# Patient Record
Sex: FEMALE | Race: WHITE
[De-identification: names, ages, dates, MRNs, and addresses within clinical notes are randomized per-mention and may not be internally consistent; named-entity substitution may affect disease eponyms.]

---

## 2022-08-08 ENCOUNTER — HOSPITAL ENCOUNTER (EMERGENCY)
Dept: HOSPITAL 97 - ER | Age: 57
Discharge: HOME | End: 2022-08-08
Payer: COMMERCIAL

## 2022-08-08 VITALS — OXYGEN SATURATION: 99 % | DIASTOLIC BLOOD PRESSURE: 99 MMHG | SYSTOLIC BLOOD PRESSURE: 152 MMHG

## 2022-08-08 VITALS — TEMPERATURE: 97.9 F

## 2022-08-08 DIAGNOSIS — L03.116: Primary | ICD-10-CM

## 2022-08-08 DIAGNOSIS — I10: ICD-10-CM

## 2022-08-08 DIAGNOSIS — L03.115: ICD-10-CM

## 2022-08-08 DIAGNOSIS — Z88.1: ICD-10-CM

## 2022-08-08 DIAGNOSIS — Z88.5: ICD-10-CM

## 2022-08-08 LAB
BUN BLD-MCNC: 27 MG/DL (ref 7–18)
CRP SERPL-MCNC: 6.34 MG/L (ref ?–3)
GLUCOSE SERPLBLD-MCNC: 124 MG/DL (ref 74–106)
HCT VFR BLD CALC: 43.4 % (ref 36–45)
LYMPHOCYTES # SPEC AUTO: 1.3 K/UL (ref 0.7–4.9)
MCV RBC: 88.8 FL (ref 80–100)
PMV BLD: 7.9 FL (ref 7.6–11.3)
POTASSIUM SERPL-SCNC: 4.7 MMOL/L (ref 3.5–5.1)
RBC # BLD: 4.89 M/UL (ref 3.86–4.86)

## 2022-08-08 PROCEDURE — 86140 C-REACTIVE PROTEIN: CPT

## 2022-08-08 PROCEDURE — 85025 COMPLETE CBC W/AUTO DIFF WBC: CPT

## 2022-08-08 PROCEDURE — 84145 PROCALCITONIN (PCT): CPT

## 2022-08-08 PROCEDURE — 87070 CULTURE OTHR SPECIMN AEROBIC: CPT

## 2022-08-08 PROCEDURE — 36415 COLL VENOUS BLD VENIPUNCTURE: CPT

## 2022-08-08 PROCEDURE — 99283 EMERGENCY DEPT VISIT LOW MDM: CPT

## 2022-08-08 PROCEDURE — 87205 SMEAR GRAM STAIN: CPT

## 2022-08-08 PROCEDURE — 85652 RBC SED RATE AUTOMATED: CPT

## 2022-08-08 PROCEDURE — 80048 BASIC METABOLIC PNL TOTAL CA: CPT

## 2022-08-08 NOTE — XMS REPORT
Continuity of Care Document

                            Created on:2022



Patient:NICOLE BUNN

Sex:Female

:1965

External Reference #:[id number]





Demographics







                          Address                   [address] [place] [address]

 

                          Home Phone                [phone]

 

                          Mobile Phone              [phone]

 

                          Email Address             [email or web address]

 

                          Preferred Language        Unknown

 

                          Marital Status            Unknown

 

                          Anglican Affiliation     Unknown

 

                          Race                      Unknown

 

                          Ethnic Group              Unknown









Author







                          Organization              [facility]

t

 

                          Address                   [address]



                                                    [place] [address]

 

                          Phone                     [phone]









Support







                Name            Relationship    Address         Phone

 

                1               Unavailable     Unavailable     Unavailable









Care Team Providers







                    Name                Role                Phone

 

                    BIANCA NÚÑEZ Attending Clinician Unavailable

 

                    Wilton SALGADO, Bianca Cotter Attending Clinician [phone]

0

 

                    [facility]               Attending Clinician Unavailable









Payers







           Payer Name Policy Type Policy Number Effective Date Expiration Date S

shlomo

 

           [employer]       2          [id number] 2022 00:00:00            







Problems

This patient has no known problems.



Allergies, Adverse Reactions, Alerts

This patient has no known allergies or adverse reactions.



Medications

This patient has no known medications.



Procedures

This patient has no known procedures.



Encounters







        Start   End     Encounter Admission Attending Care    Care    Encounter 

Source



        Date/Time Date/Time Type    Type    Clinicians Facility Department ID   

   

 

        2022 Outpatient         ASHA NÚÑEZ  [id number]

[address]



        13:30:00 13:30:00                 BIANCA wilson

 

        2022 Office          Akash Núñez    [id number]

[address]



        16:15:00 16:45:00 Visit           Bianca Wallace [id number]         Se

gómez Cotter         [id number]         



                                                        [phone]         



                                                        0               

 

        2022 Outpatient         [facility]   ASHA BARRY  [id number]

[address]



        10:15:00 10:15:00                                                 Jose Luisol

steve

 

        2022 Office          Akash Núñez    [id number]

[address]



        09:30:00 10:00:00 Visit           Bianca Wallace [id number]         Se

gómez Morrisonyi         [id number]         



                                                        [phone]         



                                                        0               







Results

This patient has no known results.

## 2022-08-08 NOTE — RAD REPORT
EXAM DESCRIPTION:  RAD - Foot Right 3 View - 8/8/2022 8:31 am

 

CLINICAL HISTORY:  cellulitis great toe x 3 weeks, eval for osteo

 

COMPARISON:  Foot Right 3 View dated 7/29/2022

 

FINDINGS:  No fracture of the first toe phalanges. No cortical disruption or other findings of bone d
estructive osteomyelitis. Degenerative findings on the July 29 study have not changed.

 

Soft tissues of the lower extremity are thickened and edematous matching prior examination. No new ai
r, calcification or foreign body in the soft tissues.

 

IMPRESSION:  No new finding since the July 29 examination.

 

Continued surveillance can be performed if there is ongoing soft tissue infection. Osteomyelitis can 
be present prior to radiographic bone destruction.

## 2022-08-08 NOTE — EDPHYS
Physician Documentation                                                                           

 Former[facility]                                                                 

Name: Herminia Amaro                                                                             

Age: 57 yrs                                                                                       

Sex: Female                                                                                       

: 1965                                                                                   

MRN: [id number]                                                                                   

Arrival Date: 2022                                                                          

Time: 07:14                                                                                       

Account#: [id number]                                                                            

Bed 5                                                                                             

Private MD:                                                                                       

ED Physician Dennis Walls                                                                         

HPI:                                                                                              

                                                                                             

07:49 This 57 yrs old Female presents to ER via Wheelchair with complaints of cellulitis.     rn  

07:49 The patient presents with cellulitis of the right foot and left foot. Description:      rn  

      erythematous, warm. Onset: The symptoms/episode began/occurred 2 week(s) ago. Possible      

      cause(s): unknown. Associated signs and symptoms: Pertinent positives: erythema,            

      swelling, Pertinent negatives: fever. Modifying factors: the symptoms are alleviated by     

      prescription meds, clindamycin. Severity of symptoms: At their worst the symptoms were      

      mild, in the emergency department the symptoms have improved. The patient has not           

      experienced similar symptoms in the past. The patient has been recently seen by a           

      physician:. Pt reports infection of feet for 2 weeks. Seen by physician when it began,      

      put on bactrim, didn't seem to be doing much, recently put on clindamycin for 2 days        

      with some improvement of redness. Reports small blisters to big toes recently popped,       

      no fever, no streaking, no pain. Has been soaking in epsom salt and listerine. .            

                                                                                                  

Historical:                                                                                       

- Allergies:                                                                                      

07:41 Codeine;                                                                                jl7 

07:41 Augmentin;                                                                              jl7 

- Home Meds:                                                                                      

07:41 olmesartan 40 mg oral tab 1 tab once daily [Active];                                    jl7 

- PMHx:                                                                                           

07:41 Hypertensive disorder; guillain-barre syndrome;                                         jl7 

                                                                                                  

- Immunization history:: Client reports having NOT received the Covid vaccine.                    

- Social history:: Smoking status: Patient denies any tobacco usage or history of.                

- Family history:: not pertinent.                                                                 

- Hospitalizations: : No recent hospitalization is reported.                                      

                                                                                                  

                                                                                                  

ROS:                                                                                              

07:49 Constitutional: Negative for fever, chills, and weight loss, Eyes: Negative for injury, rn  

      pain, redness, and discharge, Neck: Negative for injury, pain, and swelling,                

      Cardiovascular: Negative for chest pain, palpitations, and edema, Respiratory: Negative     

      for shortness of breath, cough, wheezing, and pleuritic chest pain, Abdomen/GI:             

      Negative for abdominal pain, nausea, vomiting, diarrhea, and constipation, Back:            

      Negative for injury and pain, MS/Extremity: Negative for injury and deformity, Skin: +      

      redness and swelling to bitleral 1st toes. Neuro: Negative for headache, weakness,          

      numbness, tingling, and seizure.                                                            

                                                                                                  

Exam:                                                                                             

07:49 Constitutional:  This is a well developed, well nourished patient who is awake, alert,  rn  

      and in no acute distress. Head/Face:  Normocephalic, atraumatic. Cardiovascular:            

      Regular rate and rhythm.  No pulse deficits. Respiratory:  No increased work of             

      breathing, no retractions or nasal flaring. Skin:  Warm, dry. + mild erythema to            

      bilateral 1st toes, each with unroofed blisters, right toe blister is dry and               

      non-fluctuant, left toe blister is still draining a small amount of pus and also            

      non-fluctuanct. No streaking. MS/ Extremity:  Pulses equal, no cyanosis.  Neuro:  Awake     

      and alert, GCS 15                                                                           

                                                                                                  

Vital Signs:                                                                                      

07:38 Weight 136.08 kg; Height 5 ft. 6 in. (167.64 cm); Pain 3/10;                            jl7 

07:51 Temp 97.9(O);                                                                           kr3 

09:35  / 99; Pulse 79; Resp 16; Pulse Ox 99% on R/A;                                    kr3 

07:38 Body Mass Index 48.42 (136.08 kg, 167.64 cm)                                            jl7 

                                                                                                  

MDM:                                                                                              

07:14 Patient medically screened.                                                             rn  

09:11 Differential diagnosis: cellulitis. Data reviewed: vital signs, nurses notes, lab test  rn  

      result(s), radiologic studies, plain films, and as a result, I will discharge patient.      

      Counseling: I had a detailed discussion with the patient and/or guardian regarding: the     

      historical points, exam findings, and any diagnostic results supporting the                 

      discharge/admit diagnosis, lab results, radiology results, the need for outpatient          

      follow up, to return to the emergency department if symptoms worsen or persist or if        

      there are any questions or concerns that arise at home. Special discussion: I discussed     

      with the patient/guardian in detail that at this point there is no indication for           

      admission to the hospital. It is understood, however, that if the symptoms persist or       

      worsen the patient needs to return immediately for re-evaluation. ED course: WBC            

      normal, sed rate normal, no osteo on xrays, stable vitals and pt reports improving on       

      clindamycin. Will dc home with continuation of clindamycin, told to not soak so much,       

      continue mupirocin on open blisters, and return if worsens for admission at that point..    

                                                                                                  

                                                                                             

07:49 Order name: CBC with Diff; Complete Time: 08:51                                         rn  

                                                                                             

07:49 Order name: Basic Metabolic Panel; Complete Time: 09:10                                 rn  

                                                                                             

07:49 Order name: Procalcitonin                                                               rn  

                                                                                             

07:49 Order name: Sed Rate; Complete Time: 08:51                                              rn  

                                                                                             

07:49 Order name: CRP; Complete Time: 09:10                                                   rn  

                                                                                             

07:49 Order name: Wound Culture                                                               rn  

                                                                                             

07:49 Order name: XRAY Foot LEFT 3 View; Complete Time: 08:51                                 rn  

                                                                                             

07:49 Order name: XRAY Foot RIGHT 3 View; Complete Time: 08:51                                rn  

                                                                                             

07:49 Order name: IV Start; Complete Time: 08:12                                              rn  

                                                                                                  

Administered Medications:                                                                         

No medications were administered                                                                  

                                                                                                  

                                                                                                  

Disposition Summary:                                                                              

22 09:12                                                                                    

Discharge Ordered                                                                                 

      Location: Home                                                                          rn  

      Problem: new                                                                            rn  

      Symptoms: have improved                                                                 rn  

      Condition: Stable                                                                       rn  

      Diagnosis                                                                                   

        - Cellulitis of left lower limb                                                       rn  

        - Cellulitis of right lower limb                                                      rn  

      Followup:                                                                               rn  

        - With: Private Physician                                                                  

        - When: As needed                                                                          

        - Reason: Recheck today's complaints, Re-evaluation by your physician                      

      Discharge Instructions:                                                                     

        - Discharge Summary Sheet                                                             rn  

        - Cellulitis, Adult                                                                   rn  

      Forms:                                                                                      

        - Medication Reconciliation Form                                                      rn  

        - Thank You Letter                                                                    rn  

        - Antibiotic Education                                                                rn  

        - Prescription Opioid Use                                                             rn  

Signatures:                                                                                       

Dispatcher [employer]                           Dennis Berg MD MD rn Leal, Jahala, RN                        RN   jl7                                                  

                                                                                                  

**************************************************************************************************

## 2022-08-08 NOTE — ER
Nurse's Notes                                                                                     

 [facility]                                                                 

Name: Herminia Amaro                                                                             

Age: 57 yrs                                                                                       

Sex: Female                                                                                       

: 1965                                                                                   

MRN: [id number]                                                                                   

Arrival Date: 2022                                                                          

Time: 07:14                                                                                       

Account#: [id number]                                                                            

Bed 5                                                                                             

Private MD:                                                                                       

Diagnosis: Cellulitis of left lower limb;Cellulitis of right lower limb                           

                                                                                                  

Presentation:                                                                                     

                                                                                             

07:38 Chief complaint: Patient states: Bilateral feet swelling and blisters for a few weeks,  jl7 

      PCP started antibiotics on 22, clindamycin started 22, redness improved,           

      blisters worse. Coronavirus screen: At this time, the client does not indicate any          

      symptoms associated with coronavirus-19. Ebola Screen: No symptoms or risks identified      

      at this time. Risk Assessment: Do you want to hurt yourself or someone else? Patient        

      reports no desire to harm self or others. Onset of symptoms was 2022.                  

07:38 Method Of Arrival: Wheelchair                                                           [facility] 

07:38 Acuity: MARCIO 3                                                                           [facility] 

09:40 Initial Sepsis Screen: Does the patient meet any 2 criteria? No. Patient's initial      kr3 

      sepsis screen is negative. Does the patient have a suspected source of infection? Yes:      

      Skin breakdown/wound.                                                                       

                                                                                                  

Triage Assessment:                                                                                

07:41 General: Appears in no apparent distress. uncomfortable, Behavior is calm, cooperative, jl7 

      appropriate for age. Pain: Complains of pain in right foot and left foot Pain currently     

      is 3 out of 10 on a pain scale.                                                             

                                                                                                  

Historical:                                                                                       

- Allergies:                                                                                      

07:41 Codeine;                                                                                jl7 

07:41 Augmentin;                                                                              jl7 

- Home Meds:                                                                                      

07:41 olmesartan 40 mg oral tab 1 tab once daily [Active];                                    jl7 

- PMHx:                                                                                           

07:41 Hypertensive disorder; guillain-barre syndrome;                                         jl7 

                                                                                                  

- Immunization history:: Client reports having NOT received the Covid vaccine.                    

- Social history:: Smoking status: Patient denies any tobacco usage or history of.                

- Family history:: not pertinent.                                                                 

- Hospitalizations: : No recent hospitalization is reported.                                      

                                                                                                  

                                                                                                  

Screenin:37 Abuse screen: Denies threats or abuse. Nutritional screening: No deficits noted.        kr3 

      Tuberculosis screening: No symptoms or risk factors identified. Fall Risk IV access (20     

      points). Total Singh Fall Scale indicates No Risk (0-24 pts).                               

                                                                                                  

Assessment:                                                                                       

07:55 General: Appears in no apparent distress. comfortable, Behavior is calm, cooperative,   kr3 

      appropriate for age. Derm: Wound noted multiple bilateral abrasion of lower extremities     

      at different stage of healing.                                                              

09:38 Reassessment: No changes from previously documented assessment. Patient and/or family   kr3 

      updated on plan of care and expected duration. Pain level reassessed. Musculoskeletal:.     

                                                                                                  

Vital Signs:                                                                                      

07:38 Weight 136.08 kg; Height 5 ft. 6 in. (167.64 cm); Pain 3/10;                            jl7 

07:51 Temp 97.9(O);                                                                           kr3 

09:35  / 99; Pulse 79; Resp 16; Pulse Ox 99% on R/A;                                    kr3 

07:38 Body Mass Index 48.42 (136.08 kg, 167.64 cm)                                            jl7 

                                                                                                  

ED Course:                                                                                        

07:14 Patient arrived in ED.                                                                  am2 

07:14 Dennis Walls MD is Attending Physician.                                                rn  

07:38 Arm band placed on Patient placed in an exam room, on a stretcher.                      ll1 

07:41 Triage completed.                                                                       jl7 

07:50 Aurora Castro RN is Primary Nurse.                                                      kr3 

08:05 Inserted saline lock: 22 gauge in right antecubital area, using aseptic technique.      kr3 

      Blood collected.                                                                            

08:13 Wound Culture Sent.                                                                     kr3 

08:13 CRP Sent.                                                                               kr3 

08:13 Procalcitonin Sent.                                                                     kr3 

08:13 Sed Rate Sent.                                                                          kr3 

08:13 Basic Metabolic Panel Sent.                                                             kr3 

08:13 CBC with Diff Sent.                                                                     kr3 

08:30 Bed in low position. Call light in reach. Side rails up X 1.                            kr3 

08:36 XRAY Foot RIGHT 3 View In Process Unspecified.                                          EDMS

08:36 XRAY Foot LEFT 3 View In Process Unspecified.                                           EDMS

09:37 No provider procedures requiring assistance completed.                                  kr3 

09:40 IV discontinued, intact, bleeding controlled, No redness/swelling at site. Pressure     kr3 

      dressing applied.                                                                           

                                                                                                  

Administered Medications:                                                                         

No medications were administered                                                                  

                                                                                                  

                                                                                                  

Medication:                                                                                       

09:41 VIS not applicable for this client.                                                     kr3 

                                                                                                  

Outcome:                                                                                          

09:12 Discharge ordered by MD.                                                                rn  

09:40 Discharged to home via wheelchair.                                                      kr3 

09:40 Condition: stable                                                                           

09:40 Discharge instructions given to patient, Instructed on discharge instructions, follow       

      up and referral plans. Demonstrated understanding of instructions, follow-up care.          

09:41 Patient left the ED.                                                                    kr3 

                                                                                                  

Signatures:                                                                                       

Dispatcher [employer]                           EDDennis Crouch MD MD rn Leal, Jahala RN                        RN   jl7                                                  

Merced Vazquez Lynsay, RN                       RN   ll1                                                  

Aurora Castro RN                        RN   kr3                                                  

                                                                                                  

Corrections: (The following items were deleted from the chart)                                    

09:24 09:19 General: Appears in no apparent distress. comfortable, Behavior is calm,          kr3 

      cooperative, appropriate for age, kr3                                                       

:24 09:19 Derm: Wound noted multiple bilateral abrasion of lower extremities at different   kr3 

      stage of healing kr3                                                                        

                                                                                                  

**************************************************************************************************

## 2022-08-08 NOTE — RAD REPORT
EXAM DESCRIPTION:  RAD - Foot Left 3 View - 8/8/2022 8:31 am

 

CLINICAL HISTORY:  cellulitis great toe x 2 weeks, eval for osteo

 

COMPARISON:  Foot Left 3 View dated 7/29/2022

 

FINDINGS:  No fracture of the first toe phalanges. No cortical disruption or other findings of bone d
estructive osteomyelitis. Degenerative findings detailed July 29 have not changed.

 

Overall soft tissue edema of the left foot and ankle. No new air, foreign body or calcification in th
e soft tissues.

 

IMPRESSION:  No osteomyelitis findings at this time.

 

Degenerative findings are unchanged from the July 29 examination.

 

Ongoing surveillance can be performed if there is continued soft tissue infection. Osteomyelitis can 
be present prior to radiographic bone destruction.

## 2022-08-11 ENCOUNTER — HOSPITAL ENCOUNTER (EMERGENCY)
Dept: HOSPITAL 97 - ER | Age: 57
Discharge: HOME | End: 2022-08-11
Payer: COMMERCIAL

## 2022-08-11 VITALS — SYSTOLIC BLOOD PRESSURE: 153 MMHG | DIASTOLIC BLOOD PRESSURE: 72 MMHG | TEMPERATURE: 98.7 F | OXYGEN SATURATION: 98 %

## 2022-08-11 DIAGNOSIS — I10: ICD-10-CM

## 2022-08-11 DIAGNOSIS — Z88.1: ICD-10-CM

## 2022-08-11 DIAGNOSIS — T36.8X5A: ICD-10-CM

## 2022-08-11 DIAGNOSIS — Z88.5: ICD-10-CM

## 2022-08-11 DIAGNOSIS — L30.9: Primary | ICD-10-CM

## 2022-08-11 PROCEDURE — 99283 EMERGENCY DEPT VISIT LOW MDM: CPT

## 2022-08-11 NOTE — ER
Nurse's Notes                                                                                     

 [facility]                                                                 

Name: Herminia Amaro                                                                             

Age: 57 yrs                                                                                       

Sex: Female                                                                                       

: 1965                                                                                   

MRN: [id number]                                                                                   

Arrival Date: 2022                                                                          

Time: 03:33                                                                                       

Account#: [id number]                                                                            

Bed Waiting                                                                                       

Private MD:                                                                                       

Diagnosis: Dermatitis, unspecified;Adverse effect of unspecified drugs, medicaments and biological

  substances-BACTRIM                                                                              

                                                                                                  

Presentation:                                                                                     

                                                                                             

03:43 Chief complaint: Patient states: she is taking bactrim and clindamycin for cellulitis   bb  

      in her feet and broke out in a rash yesterday which is itching. Coronavirus screen: At      

      this time, the client does not indicate any symptoms associated with coronavirus-19.        

      Ebola Screen: No symptoms or risks identified at this time. Initial Sepsis Screen: Does     

      the patient meet any 2 criteria? No. Patient's initial sepsis screen is negative. Does      

      the patient have a suspected source of infection? No. Patient's initial sepsis screen       

      is negative. Risk Assessment: Do you want to hurt yourself or someone else? Patient         

      reports no desire to harm self or others. Onset of symptoms was August 10, 2022.            

03:43 Method Of Arrival: Wheelchair                                                           bb  

03:43 Acuity: MARCIO 4                                                                           bb  

04:00 Note pt seen by Dr Ruiz in triage evaluated and discharged.                         bb  

                                                                                                  

Triage Assessment:                                                                                

03:44 General: Appears in no apparent distress. uncomfortable, Behavior is calm, cooperative. bb  

      Pain: Denies pain. Neuro: Level of Consciousness is awake, alert, obeys commands,           

      Oriented to person, place, time, situation. Cardiovascular: Capillary refill < 3            

      seconds Patient's skin is warm and dry. Respiratory: Respiratory effort is even,            

      unlabored, Respiratory pattern is regular. GI: No signs and/or symptoms were reported       

      involving the gastrointestinal system. Derm: Rash noted that is papular.                    

      Musculoskeletal: No signs and/or symptoms reported regarding the musculoskeletal system.    

                                                                                                  

Historical:                                                                                       

- Allergies:                                                                                      

03:44 Augmentin;                                                                              bb  

03:44 Codeine;                                                                                bb  

- PMHx:                                                                                           

03:44 guillain-barre syndrome; Hypertensive disorder;                                         bb  

                                                                                                  

- Immunization history:: Client reports having NOT received the Covid vaccine.                    

- Social history:: Smoking status: Patient denies any tobacco usage or history of.                

- Family history:: not pertinent.                                                                 

                                                                                                  

                                                                                                  

Assessment:                                                                                       

04:11 Reassessment: Patient is alert, oriented x 3, equal unlabored respirations, skin        bb  

      warm/dry/pink. pt discharged from triage pt verbalized understanding of and agrees to       

      plan of care discharge instructions given pt exit via wheelchair accompanied by spouse.     

                                                                                                  

Vital Signs:                                                                                      

03:43  / 72; Pulse 86; Resp 16 S; Temp 98.7(O); Pulse Ox 98% on R/A; Weight 129.27 kg   bb  

      (R); Height 5 ft. 6 in. (167.64 cm) (R); Pain 0/10;                                         

03:43 Body Mass Index 46.00 (129.27 kg, 167.64 cm)                                            bb  

                                                                                                  

ED Course:                                                                                        

03:33 Patient arrived in ED.                                                                  bp1 

03:44 Triage completed.                                                                       bb  

03:44 Arm band placed on.                                                                     bb  

03:59 Yung Ruiz MD is Attending Physician.                                             papo 

04:06 Natty Fischer, RN is Primary Nurse.                                                   bb  

04:12 No provider procedures requiring assistance completed. Patient did not have IV access   bb  

      during this emergency room visit.                                                           

                                                                                                  

Administered Medications:                                                                         

04:06 Drug: Benadryl (diphenhydrAMINE) 50 mg Route: PO;                                       bb  

04:11 Follow up: Response: Medication administered at discharge.                              bb  

04:06 Drug: Pepcid (famotidine) 40 mg Route: PO;                                              bb  

04:11 Follow up: Response: Medication administered at discharge.                              bb  

04:06 Drug: predniSONE 60 mg Route: PO;                                                       bb  

04:10 Follow up: Response: Medication administered at discharge.                              bb  

04:06 Drug: KeFLEX (cephalexin) 500 mg Route: PO;                                             bb  

04:10 Follow up: Response: Medication administered at discharge.                              bb  

                                                                                                  

                                                                                                  

Outcome:                                                                                          

04:04 Discharge ordered by MD.                                                                [facility] 

04:12 Discharged to home via wheelchair, with family.                                         bb  

04:12 Condition: stable                                                                           

04:12 Discharge instructions given to patient, Instructed on discharge instructions, follow       

      up and referral plans. medication usage, Demonstrated understanding of instructions,        

      follow-up care, medications, Prescriptions given X 3.                                       

04:12 Patient left the ED.                                                                    bb  

                                                                                                  

Signatures:                                                                                       

Yung Ruiz MD MD cha Ballard, Brenda, RN                     RN   bb                                                   

Yancy Salgado                           bp1                                                  

                                                                                                  

**************************************************************************************************

## 2022-08-11 NOTE — EDPHYS
Physician Documentation                                                                           

 [facility]                                                                 

Name: Herminia Amaro                                                                             

Age: 57 yrs                                                                                       

Sex: Female                                                                                       

: 1965                                                                                   

MRN: [id number]                                                                                   

Arrival Date: 2022                                                                          

Time: 03:33                                                                                       

Account#: [id number]                                                                            

Bed Waiting                                                                                       

Private MD:                                                                                       

[facility] Physician Yung Ruiz                                                                      

HPI:                                                                                              

                                                                                             

04:00 This 57 yrs old  Female presents to ER via Wheelchair with complaints of Rash, papo 

      Inquicker 4:15.                                                                             

04:00 The patient's rash thought to be caused by medication, Dermatitis an unknown cause. The papo 

      rash is located on the back, chest, right arm and left arm. The rash can be described       

      as erythematous, raised. Onset: The symptoms/episode began/occurred 2 day(s) ago.           

      Associated signs and symptoms: Pertinent positives: burning sensation, itching.             

      Severity of symptoms: At their worst the symptoms were mild moderate in the emergency       

      department the symptoms are unchanged. Treatment given at home: Benadryl. It is unknown     

      whether or not the patient has had similar symptoms in the past.                            

                                                                                                  

Historical:                                                                                       

- Allergies:                                                                                      

03:44 Augmentin;                                                                              bb  

03:44 Codeine;                                                                                bb  

- PMHx:                                                                                           

03:44 guillain-barre syndrome; Hypertensive disorder;                                         bb  

                                                                                                  

- Immunization history:: Client reports having NOT received the Covid vaccine.                    

- Social history:: Smoking status: Patient denies any tobacco usage or history of.                

- Family history:: not pertinent.                                                                 

                                                                                                  

                                                                                                  

ROS:                                                                                              

04:00 Constitutional: Negative for fever, chills, and weight loss, Eyes: Negative for injury, papo 

      pain, redness, and discharge, ENT: Negative for injury, pain, and discharge, Neck:          

      Negative for injury, pain, and swelling, Cardiovascular: Negative for chest pain,           

      palpitations, and edema, Respiratory: Negative for shortness of breath, cough,              

      wheezing, and pleuritic chest pain, Abdomen/GI: Negative for abdominal pain, nausea,        

      vomiting, diarrhea, and constipation, Back: Negative for injury and pain, : Negative      

      for injury, bleeding, discharge, and swelling, MS/Extremity: Negative for injury and        

      deformity, Neuro: Negative for headache, weakness, numbness, tingling, and seizure,         

      Psych: Negative for depression, anxiety, suicide ideation, homicidal ideation, and          

      hallucinations, Allergy/Immunology: Negative for hives, rash, and allergies, Endocrine:     

      Negative for neck swelling, polydipsia, polyuria, polyphagia, and marked weight             

      changes, Hematologic/Lymphatic: Negative for swollen nodes, abnormal bleeding, and          

      unusual bruising.                                                                           

04:00 Skin: Positive for erythema, rash, diffusely.                                               

                                                                                                  

Exam:                                                                                             

04:00 Constitutional:  This is a well developed, well nourished patient who is awake, alert,  papo 

      and in no acute distress. Head/Face:  Normocephalic, atraumatic. Eyes:  Pupils equal        

      round and reactive to light, extra-ocular motions intact.  Lids and lashes normal.          

      Conjunctiva and sclera are non-icteric and not injected.  Cornea within normal limits.      

      Periorbital areas with no swelling, redness, or edema. ENT:  Nares patent. No nasal         

      discharge, no septal abnormalities noted.  Tympanic membranes are normal and external       

      auditory canals are clear.  Oropharynx with no redness, swelling, or masses, exudates,      

      or evidence of obstruction, uvula midline.  Mucous membranes moist. Neck:  Trachea          

      midline, no thyromegaly or masses palpated, and no cervical lymphadenopathy.  Supple,       

      full range of motion without nuchal rigidity, or vertebral point tenderness.  No            

      Meningismus. Chest/axilla:  Normal chest wall appearance and motion.  Nontender with no     

      deformity.  No lesions are appreciated. Cardiovascular:  Regular rate and rhythm with a     

      normal S1 and S2.  No gallops, murmurs, or rubs.  Normal PMI, no JVD.  No pulse             

      deficits. Respiratory:  Lungs have equal breath sounds bilaterally, clear to                

      auscultation and percussion.  No rales, rhonchi or wheezes noted.  No increased work of     

      breathing, no retractions or nasal flaring. Abdomen/GI:  Soft, non-tender, with normal      

      bowel sounds.  No distension or tympany.  No guarding or rebound.  No evidence of           

      tenderness throughout. Back:  No spinal tenderness.  No costovertebral tenderness.          

      Full range of motion. Female :  Normal external genitalia. MS/ Extremity:  Pulses         

      equal, no cyanosis.  Neurovascular intact.  Full, normal range of motion. Neuro:  Awake     

      and alert, GCS 15, oriented to person, place, time, and situation.  Cranial nerves          

      II-XII grossly intact.  Motor strength 5/5 in all extremities.  Sensory grossly intact.     

       Cerebellar exam normal.  Normal gait. Psych:  Awake, alert, with orientation to            

      person, place and time.  Behavior, mood, and affect are within normal limits.               

04:00 Skin: Appearance: Color: normal in color, Temperature: normal temperature, Moisture:        

      normal moisture, petechiae, not noted, ecchymosis, not noted, flushing, not noted,          

      diaphoresis is not appreciated, swelling, is not appreciated.                               

                                                                                                  

Vital Signs:                                                                                      

03:43  / 72; Pulse 86; Resp 16 S; Temp 98.7(O); Pulse Ox 98% on R/A; Weight 129.27 kg   bb  

      (R); Height 5 ft. 6 in. (167.64 cm) (R); Pain 0/10;                                         

03:43 Body Mass Index 46.00 (129.27 kg, 167.64 cm)                                            bb  

                                                                                                  

MDM:                                                                                              

03:59 Patient medically screened.                                                             papo 

                                                                                                  

Administered Medications:                                                                         

04:06 Drug: Benadryl (diphenhydrAMINE) 50 mg Route: PO;                                       bb  

04:11 Follow up: Response: Medication administered at discharge.                              bb  

04:06 Drug: Pepcid (famotidine) 40 mg Route: PO;                                              bb  

04:11 Follow up: Response: Medication administered at discharge.                              bb  

04:06 Drug: predniSONE 60 mg Route: PO;                                                       bb  

04:10 Follow up: Response: Medication administered at discharge.                              bb  

04:06 Drug: KeFLEX (cephalexin) 500 mg Route: PO;                                             bb  

04:10 Follow up: Response: Medication administered at discharge.                                

                                                                                                  

                                                                                                  

Disposition Summary:                                                                              

22 04:04                                                                                    

Discharge Ordered                                                                                 

      Location: Home                                                                          papo 

      Problem: new                                                                            papo 

      Symptoms: have improved                                                                 papo 

      Condition: Stable                                                                       papo 

      Diagnosis                                                                                   

        - Dermatitis, unspecified                                                             papo 

        - Adverse effect of unspecified drugs, medicaments and biological substances - BACTRIMcha 

      Followup:                                                                               papo 

        - With: Private Physician                                                                  

        - When: 2 - 3 days                                                                         

        - Reason: Recheck today's complaints, Continuance of care, Re-evaluation by your           

      physician                                                                                   

      Discharge Instructions:                                                                     

        - Discharge Summary Sheet                                                             papo 

        - Drug Allergy, Easy-to-Read                                                          papo 

        - Drug Allergy                                                                        papo 

        - Rash, Adult                                                                         papo 

        - Rash, Adult, Easy-to-Read                                                           papo 

      Forms:                                                                                      

        - Medication Reconciliation Form                                                      papo 

        - Thank You Letter                                                                    papo 

        - Antibiotic Education                                                                papo 

        - Prescription Opioid Use                                                             papo 

      Prescriptions:                                                                              

        - Benadryl 25 mg Oral Capsule                                                              

            - take 2 capsule by ORAL route every 6 hours As needed; 45 tablet; Refills: 0,    papo 

      Product Selection Permitted                                                                 

        - Cephalexin 500 mg Oral Capsule                                                           

            - take 1 capsule by ORAL route every 6 hours for 7 days; 28 capsule; Refills: 0,  [facility] 

      Product Selection Permitted                                                                 

        - Pepcid 20 mg Oral Tablet                                                                 

            - take 1 tablet by ORAL route every 12 hours for 15 days; 30 tablet; Refills: 0,  [facility] 

      Product Selection Permitted                                                                 

Signatures:                                                                                       

Yung Ruiz, Natty Nguyen MD, cha, RN                     RN   bb                                                   

                                                                                                  

**************************************************************************************************

## 2022-08-11 NOTE — XMS REPORT
Continuity of Care Document

                           Created on:2022



Patient:NICOLE BUNN

Sex:Female

:1965

External Reference #:[id number]





Demographics







                          Address                   [address] [place] [address]

 

                          Home Phone                [phone]

 

                          Mobile Phone              [phone]

 

                          Email Address             [email or web address]

 

                          Preferred Language        Unknown

 

                          Marital Status            Unknown

 

                          Evangelical Affiliation     Unknown

 

                          Race                      Unknown

 

                          Additional Race(s)        Unavailable

 

                          Ethnic Group              Unknown









Author







                          Organization              [facility]

t

 

                          Address                   [address]



                                                    [place] [address]

 

                          Phone                     [phone]









Support







                Name            Relationship    Address         Phone

 

                1               Unavailable     Unavailable     Unavailable









Care Team Providers







                    Name                Role                Phone

 

                    BIANCA NÚÑEZ Attending Clinician Unavailable

 

                    Wilton SALGADO, Bianca Cotter Attending Clinician [phone]

0

 

                    [facility]               Attending Clinician Unavailable









Payers







           Payer Name Policy Type Policy Number Effective Date Expiration Date S

shlomo

 

           [employer]       2          [id number] 2022 00:00:00            







Problems

This patient has no known problems.



Allergies, Adverse Reactions, Alerts

This patient has no known allergies or adverse reactions.



Medications

This patient has no known medications.



Procedures

This patient has no known procedures.



Encounters







        Start   End     Encounter Admission Attending Care    Care    Encounter 

Source



        Date/Time Date/Time Type    Type    Clinicians Facility Department ID   

   

 

        2022 Outpatient         ASHA NÚÑEZ  [id number]

[address]



        13:30:00 13:30:00                 BIANCA wilson

 

        2022 Outpatient         ASHA NÚÑEZ  [id number]

[address]



        00:00:00 00:00:00                 BIANCA wilson

 

        2022 Office          Akash Núñez    [id number]

[address]



        16:15:00 16:45:00 Visit           Bianca Wallace [id number]         Se gómez Cotter         [id number]         



                                                        [phone]         



                                                        0               

 

        2022 Outpatient         [facility]   ASHA BARRY  [id number]

[address]



        10:15:00 10:15:00                                                 Adilene wilson

 

        2022 Office          Akash Núñez    [id number]

[address]



        09:30:00 10:00:00 Visit           Bianca Wallace [id number]         Se

gómez Cotter         [id number]         



                                                        [phone]         



                                                        0               







Results

This patient has no known results.

## 2022-08-12 ENCOUNTER — HOSPITAL ENCOUNTER (INPATIENT)
Dept: HOSPITAL 97 - ER | Age: 57
LOS: 17 days | Discharge: NURSING, REHAB OR LONG-TERM CARE | DRG: 871 | End: 2022-08-29
Attending: INTERNAL MEDICINE | Admitting: HOSPITALIST
Payer: COMMERCIAL

## 2022-08-12 VITALS — BODY MASS INDEX: 46 KG/M2

## 2022-08-12 DIAGNOSIS — L98.2: ICD-10-CM

## 2022-08-12 DIAGNOSIS — Z79.899: ICD-10-CM

## 2022-08-12 DIAGNOSIS — E87.1: ICD-10-CM

## 2022-08-12 DIAGNOSIS — I21.A1: ICD-10-CM

## 2022-08-12 DIAGNOSIS — Z88.0: ICD-10-CM

## 2022-08-12 DIAGNOSIS — E87.5: ICD-10-CM

## 2022-08-12 DIAGNOSIS — E78.5: ICD-10-CM

## 2022-08-12 DIAGNOSIS — D63.8: ICD-10-CM

## 2022-08-12 DIAGNOSIS — R77.8: ICD-10-CM

## 2022-08-12 DIAGNOSIS — E83.42: ICD-10-CM

## 2022-08-12 DIAGNOSIS — M31.0: ICD-10-CM

## 2022-08-12 DIAGNOSIS — A41.9: Primary | ICD-10-CM

## 2022-08-12 DIAGNOSIS — B05.9: ICD-10-CM

## 2022-08-12 DIAGNOSIS — L23.89: ICD-10-CM

## 2022-08-12 DIAGNOSIS — I10: ICD-10-CM

## 2022-08-12 DIAGNOSIS — L03.116: ICD-10-CM

## 2022-08-12 DIAGNOSIS — E66.01: ICD-10-CM

## 2022-08-12 DIAGNOSIS — Z88.5: ICD-10-CM

## 2022-08-12 DIAGNOSIS — N17.0: ICD-10-CM

## 2022-08-12 DIAGNOSIS — E86.0: ICD-10-CM

## 2022-08-12 DIAGNOSIS — E87.2: ICD-10-CM
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
## 2022-08-12 DIAGNOSIS — E11.628: ICD-10-CM

## 2022-08-12 DIAGNOSIS — Z88.1: ICD-10-CM

## 2022-08-12 DIAGNOSIS — R65.20: ICD-10-CM

## 2022-08-12 DIAGNOSIS — Z79.52: ICD-10-CM

## 2022-08-12 DIAGNOSIS — Z20.822: ICD-10-CM

## 2022-08-12 DIAGNOSIS — D72.825: ICD-10-CM

## 2022-08-12 DIAGNOSIS — R33.9: ICD-10-CM

## 2022-08-12 DIAGNOSIS — T36.8X5A: ICD-10-CM

## 2022-08-12 LAB
BUN BLD-MCNC: 33 MG/DL (ref 7–18)
GLUCOSE SERPLBLD-MCNC: 205 MG/DL (ref 74–106)
HCT VFR BLD CALC: 47.1 % (ref 36–45)
LYMPHOCYTES # SPEC AUTO: 0.4 K/UL (ref 0.7–4.9)
MAGNESIUM SERPL-MCNC: 1.6 MG/DL (ref 1.8–2.4)
MCV RBC: 91.2 FL (ref 80–100)
MORPHOLOGY BLD-IMP: (no result)
PMV BLD: 8.1 FL (ref 7.6–11.3)
POTASSIUM SERPL-SCNC: 5.3 MMOL/L (ref 3.5–5.1)
RBC # BLD: 5.16 M/UL (ref 3.86–4.86)
TROPONIN I SERPL HS-MCNC: 235.9 PG/ML (ref ?–58.9)

## 2022-08-12 PROCEDURE — 83735 ASSAY OF MAGNESIUM: CPT

## 2022-08-12 PROCEDURE — 93307 TTE W/O DOPPLER COMPLETE: CPT

## 2022-08-12 PROCEDURE — 97110 THERAPEUTIC EXERCISES: CPT

## 2022-08-12 PROCEDURE — 84132 ASSAY OF SERUM POTASSIUM: CPT

## 2022-08-12 PROCEDURE — 86160 COMPLEMENT ANTIGEN: CPT

## 2022-08-12 PROCEDURE — 82306 VITAMIN D 25 HYDROXY: CPT

## 2022-08-12 PROCEDURE — 82805 BLOOD GASES W/O2 SATURATION: CPT

## 2022-08-12 PROCEDURE — 86225 DNA ANTIBODY NATIVE: CPT

## 2022-08-12 PROCEDURE — 87176 TISSUE HOMOGENIZATION CULTR: CPT

## 2022-08-12 PROCEDURE — 82570 ASSAY OF URINE CREATININE: CPT

## 2022-08-12 PROCEDURE — 84300 ASSAY OF URINE SODIUM: CPT

## 2022-08-12 PROCEDURE — 99285 EMERGENCY DEPT VISIT HI MDM: CPT

## 2022-08-12 PROCEDURE — 81001 URINALYSIS AUTO W/SCOPE: CPT

## 2022-08-12 PROCEDURE — 87088 URINE BACTERIA CULTURE: CPT

## 2022-08-12 PROCEDURE — 85652 RBC SED RATE AUTOMATED: CPT

## 2022-08-12 PROCEDURE — 85027 COMPLETE CBC AUTOMATED: CPT

## 2022-08-12 PROCEDURE — 97112 NEUROMUSCULAR REEDUCATION: CPT

## 2022-08-12 PROCEDURE — 97165 OT EVAL LOW COMPLEX 30 MIN: CPT

## 2022-08-12 PROCEDURE — 86430 RHEUMATOID FACTOR TEST QUAL: CPT

## 2022-08-12 PROCEDURE — 88305 TISSUE EXAM BY PATHOLOGIST: CPT

## 2022-08-12 PROCEDURE — 80048 BASIC METABOLIC PNL TOTAL CA: CPT

## 2022-08-12 PROCEDURE — 71045 X-RAY EXAM CHEST 1 VIEW: CPT

## 2022-08-12 PROCEDURE — 87205 SMEAR GRAM STAIN: CPT

## 2022-08-12 PROCEDURE — 80202 ASSAY OF VANCOMYCIN: CPT

## 2022-08-12 PROCEDURE — 87811 SARS-COV-2 COVID19 W/OPTIC: CPT

## 2022-08-12 PROCEDURE — 87070 CULTURE OTHR SPECIMN AEROBIC: CPT

## 2022-08-12 PROCEDURE — 80074 ACUTE HEPATITIS PANEL: CPT

## 2022-08-12 PROCEDURE — 93005 ELECTROCARDIOGRAM TRACING: CPT

## 2022-08-12 PROCEDURE — 85025 COMPLETE CBC W/AUTO DIFF WBC: CPT

## 2022-08-12 PROCEDURE — 83880 ASSAY OF NATRIURETIC PEPTIDE: CPT

## 2022-08-12 PROCEDURE — 97161 PT EVAL LOW COMPLEX 20 MIN: CPT

## 2022-08-12 PROCEDURE — 87040 BLOOD CULTURE FOR BACTERIA: CPT

## 2022-08-12 PROCEDURE — 86038 ANTINUCLEAR ANTIBODIES: CPT

## 2022-08-12 PROCEDURE — 84156 ASSAY OF PROTEIN URINE: CPT

## 2022-08-12 PROCEDURE — 81003 URINALYSIS AUTO W/O SCOPE: CPT

## 2022-08-12 PROCEDURE — 86140 C-REACTIVE PROTEIN: CPT

## 2022-08-12 PROCEDURE — 87086 URINE CULTURE/COLONY COUNT: CPT

## 2022-08-12 PROCEDURE — 83605 ASSAY OF LACTIC ACID: CPT

## 2022-08-12 PROCEDURE — 80061 LIPID PANEL: CPT

## 2022-08-12 PROCEDURE — 82550 ASSAY OF CK (CPK): CPT

## 2022-08-12 PROCEDURE — 86200 CCP ANTIBODY: CPT

## 2022-08-12 PROCEDURE — 81015 MICROSCOPIC EXAM OF URINE: CPT

## 2022-08-12 PROCEDURE — 36415 COLL VENOUS BLD VENIPUNCTURE: CPT

## 2022-08-12 PROCEDURE — 83935 ASSAY OF URINE OSMOLALITY: CPT

## 2022-08-12 PROCEDURE — 80053 COMPREHEN METABOLIC PANEL: CPT

## 2022-08-12 PROCEDURE — 86162 COMPLEMENT TOTAL (CH50): CPT

## 2022-08-12 PROCEDURE — 97530 THERAPEUTIC ACTIVITIES: CPT

## 2022-08-12 PROCEDURE — 86060 ANTISTREPTOLYSIN O TITER: CPT

## 2022-08-12 PROCEDURE — 84484 ASSAY OF TROPONIN QUANT: CPT

## 2022-08-12 PROCEDURE — 76770 US EXAM ABDO BACK WALL COMP: CPT

## 2022-08-12 PROCEDURE — 99251: CPT

## 2022-08-12 PROCEDURE — 84145 PROCALCITONIN (PCT): CPT

## 2022-08-12 PROCEDURE — 83970 ASSAY OF PARATHORMONE: CPT

## 2022-08-12 NOTE — RAD REPORT
EXAM DESCRIPTION:  RAD - Foot Left 3 View - 8/12/2022 9:43 pm

 

CLINICAL HISTORY:  fall, foot pain

 

COMPARISON:  <Comparisons>

 

FINDINGS:  No fracture, dislocation or periosteal reaction. No acute or destructive bony process.  Fi
rst MTP joint degenerative change present.

No air or foreign body in the soft tissues.

 

IMPRESSION:  Negative left foot examination for acute finding

 

No significant change from the August 8 imaging.

## 2022-08-12 NOTE — XMS REPORT
Continuity of Care Document

                           Created on:2022



Patient:NICOLE BUNN

Sex:Female

:1965

External Reference #:[id number]





Demographics







                          Address                   [address] [place] [address]

 

                          Home Phone                [phone]

 

                          Mobile Phone              [phone]

 

                          Email Address             [email or web address]

 

                          Preferred Language        Unknown

 

                          Marital Status            Unknown

 

                          Christian Affiliation     Unknown

 

                          Race                      Unknown

 

                          Additional Race(s)        Unavailable

 

                          Ethnic Group              Unknown









Author







                          Organization              [facility]

t

 

                          Address                   [address]



                                                    [place] [address]

 

                          Phone                     [phone]









Support







                Name            Relationship    Address         Phone

 

                1               CLARITZA          Unavailable     Unavailable

 

                2               Unavailable     Unavailable     Unavailable

 

                Claritza Bunn Spouse          Unavailable     [phone]









Care Team Providers







                    Name                Role                Phone

 

                    BIANCA NÚÑEZ Attending Clinician Unavailable

 

                    Bianca Núñez MD Attending Clinician [phone]

0

 

                    [facility]               Attending Clinician Unavailable









Payers







           Payer Name Policy Type Policy Number Effective Date Expiration Date S

shlomo

 

           [facility]       2          [id number] 2022 00:00:00            







Problems

This patient has no known problems.



Allergies, Adverse Reactions, Alerts

This patient has no known allergies or adverse reactions.



Medications

This patient has no known medications.



Procedures

This patient has no known procedures.



Encounters







        Start   End     Encounter Admission Attending Care    Care    Encounter 

Source



        Date/Time Date/Time Type    Type    Clinicians Facility Department ID   

   

 

        2022 Outpatient         ASHA NÚÑEZ  [id number]

[address]



        00:00:00 00:00:00                 BIANCA wilson

 

        2022 Office          Akash Núñez    [id number]

[address]



        13:30:00 14:15:00 Visit           Bianca Wallace [id number]         Se gómez Cotter         [id number]         



                                                        [phone]         



                                                        0               

 

        2022 Outpatient         ASHA NÚÑEZ  [id number]

[address]



        00:00:00 00:00:00                 BIANCA wilson

 

        2022 Outpatient         ASHA NÚÑEZ  [id number]

[address]



        00:00:00 00:00:00                 BIANCA Amayaol

d

 

        2022 Office          Akash Núñez    [id number]

[address]



        16:15:00 16:45:00 Visit           Bianca Wallace 350.1.13.13         Se

gómez Cotter         [id number]         



                                                        [phone]         



                                                        0               

 

        2022 Outpatient         [facility]   ASHA BARRY  [id number]

[address]



        10:15:00 10:15:00                                                 Seybol

d

 

        2022 Office          Akash Núñez    [id number]

[address]



        09:30:00 10:00:00 Visit           Bianca Wallace 350.1.13.13         Se

gómez Cotter         [id number]         



                                                        [phone]         



                                                        0               







Results

This patient has no known results. For information on Fall & Injury Prevention, visit [email or web address]

## 2022-08-12 NOTE — ER
Nurse's Notes                                                                                     

 [facility]                                                                 

Name: Herminia Amaro                                                                             

Age: 57 yrs                                                                                       

Sex: Female                                                                                       

: 1965                                                                                   

MRN: [id number]                                                                                   

Arrival Date: 2022                                                                          

Time: 20:28                                                                                       

Account#: [id number]                                                                            

Bed 7                                                                                             

Private MD:                                                                                       

Diagnosis: Sepsis, unspecified organism;Cellulitis of left lower limb;Cellulitis of right lower   

  limb;Acute kidney failure, unspecified;Hypomagnesemia                                           

                                                                                                  

Presentation:                                                                                     

                                                                                             

20:37 Chief complaint: Patient states: "I have been feeling really weak lately. I was sitting tw5 

      on the toilet and I slid off I was just scared I broke my foot. My left food is kind of     

      hurting.". Care prior to arrival: Medication(s) given: fluids given prior to arrival IV     

      initiated. 18 GA, in the left antecubital area. Mechanism of Injury: Fall out of chair.     

      Trauma event details: Injury occurred in the [facility], Injury occurred: at        

      home. Injury occurred: 2022 Injury occurred at: 19:30.                           

20:37 Acuity: MARCIO 3                                                                           tw5 

20:37 Method Of Arrival: EMS: [facility]                                                tw5 

20:42 Coronavirus screen: Vaccine status: Patient reports being unvaccinated. Ebola Screen:   tw5 

      Patient negative for fever greater than or equal to 101.5 degrees Fahrenheit, and           

      additional compatible Ebola Virus Disease symptoms Patient denies exposure to               

      infectious person. No symptoms or risks identified at this time. Initial Sepsis Screen:     

      Does the patient meet any 2 criteria? HR > 90 bpm. Does the patient have a suspected        

      source of infection? Yes: Skin breakdown/wound. Risk Assessment: Do you want to hurt        

      yourself or someone else? Patient reports no desire to harm self or others. Onset of        

      symptoms is unknown.                                                                        

                                                                                                  

Historical:                                                                                       

- Allergies:                                                                                      

20:42 Augmentin;                                                                              tw5 

20:42 Codeine;                                                                                tw5 

20:42 Bactrim;                                                                                tw5 

- PMHx:                                                                                           

20:42 guillain-barre syndrome; Hypertensive disorder;                                         tw5 

                                                                                                  

- Immunization history: Last tetanus immunization: none per patient choice.                       

- Social history:: Smoking status: Patient denies any tobacco usage or history of.                

                                                                                                  

                                                                                                  

Screenin:37 Abuse screen: Denies threats or abuse. Denies injuries from another. Tuberculosis       tw5 

      screening: No symptoms or risk factors identified.                                          

                                                                                             

01:03 Nutritional screening: No deficits noted. Fall Risk Fall in past 12 months (25 points). vc1 

      No secondary diagnosis (0 pts). IV access (20 points). Ambulatory Aid- None/Bed             

      Rest/Nurse Assist (0 pts). Gait- Normal/Bed Rest/Wheelchair (0 pts) Mental Status-          

      Oriented to own ability (0 pts). Total Singh Fall Scale indicates High Risk Score (45       

      or more points). Fall prevention measures have been instituted. Side Rails Up X 2           

      Frequent Obs/Assessments Occuring Family Present and informed to notify staff if the        

      need to leave the bedside.                                                                  

                                                                                                  

Primary Survey:                                                                                   

                                                                                             

20:37 NO uncontrolled hemorrhage observed. A: The client is awake and alert. The airway is    tw5 

      patent. Breathing/Chest: Spontaneous respiratory effort, equal unlabored respirations,      

      breath sounds clear bilaterally, regular pattern, symmetrical chest rise and fall.          

      Circulation: No external hemorrhage present. Regular and strong central pulse, skin         

      warm/dry/normal color. Disability Exposure/Environment: All clothing and personal items     

      were removed. Forensic evidence collection is not deemed to be indicated at this time.      

      Items placed in patient belonging bag. Reassessment Alertness and Airway: Awake and         

      alert. The airway is patent. Breathing: Spontaneous respiratory effort, equal unlabored     

      respirations, breath sounds clear bilaterally, regular pattern with symmetrical chest       

      rise and fall. Circulation: No external hemorrhage noted. Regular and strong central        

      pulse, skin warm/dry/normal color. Disability: Pupils Pupils are equal, round, reactive     

      to light and accomodation.                                                                  

                                                                                                  

Assessment:                                                                                       

20:37 General: Appears uncomfortable, Behavior is calm, cooperative, appropriate for age.     tw5 

      Pain: Complains of pain in left foot Pain currently is 3 out of 10 on a pain scale.         

                                                                                             

00:05 Reassessment: waiting on lab to draw second set of blood cultures.                      vc1 

01:04 Reassessment: No changes from previously documented assessment. Patient and/or family   vc1 

      updated on plan of care and expected duration. Pain level reassessed. Patient is alert,     

      oriented x 3, equal unlabored respirations, skin warm/dry/pink. Patient states symptoms     

      have not improved.                                                                          

                                                                                                  

Vital Signs:                                                                                      

                                                                                             

20:37  / 66; Pulse 105; Resp 18; Temp 98.5; Pulse Ox 100% ; Weight 129.27 kg; Height 5  tw5 

      ft. 6 in. (167.64 cm); Pain 3/10;                                                           

21:34  / 66; Pulse 100; Resp 18; Pulse Ox 100% ;                                        vc1 

23:34  / 85; Pulse 99; Resp 15; Pulse Ox 98% on R/A;                                    mh5 

                                                                                             

01:05  / 56; Pulse 106; Resp 15; Pulse Ox 98% ;                                         vc1 

                                                                                             

20:37 Body Mass Index 46.00 (129.27 kg, 167.64 cm)                                            tw5 

                                                                                                  

Abbie Coma Score:                                                                               

                                                                                             

20:37 Eye Response: spontaneous(4). Verbal Response: oriented(5). Motor Response: obeys       tw5 

      commands(6). Total: 15.                                                                     

                                                                                                  

Trauma Score (Adult):                                                                             

20:37 Eye Response: spontaneous(1); Verbal Response: oriented(1); Motor Response: obeys       tw5 

      commands(2); Systolic BP: > 89 mm Hg(4); Respiratory Rate: 10 to 29 per min(4); [place]     

      Score: 15; Trauma Score: 12                                                                 

20:37 Eye Response: spontaneous(1); Verbal Response: oriented(1); Motor Response: obeys       tw5 

      commands(2); Systolic BP: > 89 mm Hg(4); Respiratory Rate: 10 to 29 per min(4); Abbie     

      Score: 15; Trauma Score: 12                                                                 

                                                                                                  

ED Course:                                                                                        

20:28 Patient arrived in ED.                                                                  ja2 

20:35 Yung Hawthorne PA is PHCP.                                                                cp  

20:35 Eduardo Colin DO is Attending Physician.                                                cp  

20:37 Patient has correct armband on for positive identification.                             tw5 

20:37 Patient maintains SpO2 saturation greater than 95% on room air.                         tw5 

20:39 Triage completed.                                                                       tw5 

20:42 Arm band placed on.                                                                     tw5 

21:16 Initial lab(s) drawn, by me, sent to lab. Maintain EMS IV. Dressing intact. Good blood  mh5 

      return noted. Site clean \T\ dry.                                                           

21:17 Warm blanket given. Cardiac monitor on. Pulse ox on. NIBP on.                           mh5 

21:17 Basic Metabolic Panel Sent.                                                             mh5 

21:17 CBC with Diff Sent.                                                                     mh5 

21:17 Magnesium Sent.                                                                         mh5 

21:17 Troponin HS Sent.                                                                       mh5 

21:25 Yancy Nieto, RN is Primary Nurse.                                                bm7 

21:45 XRAY Foot LEFT 3 View In Process Unspecified.                                           EDMS

22:46 Jorje Ledesma MD is Hospitalizing Provider.                                           cp  

23:52 EKG done, by ED staff, reviewed by Yung SANTORO.                                       [facility] 

                                                                                             

00:15 Urine Microscopic Only Sent.                                                            [facility] 

00:16 Urine collected: straight cath specimen, govind colored.                                 5 

01:03 No provider procedures requiring assistance completed. Patient admitted, IV remains in  vc1 

      place.                                                                                      

                                                                                                  

Administered Medications:                                                                         

                                                                                             

21:29 Drug: NS 0.9% 500 ml Route: IV; Rate: bolus; Site: left antecubital;                    bm7 

22:59 Follow up: IV Status: Completed infusion; IV Intake: 500ml                              bm7 

21:50 Not Given (Physician Discretion): NS 0.9% 500 ml IV at 125 ml/hr continuous             cp  

23:05 Drug: NS 0.9% (30 ml/kg) 30 ml/kg Route: IV; Rate: bolus; Site: left antecubital;       bm7 

                                                                                             

01:00 Drug: vancoMYCIN 1 grams Route: IVPB; Infused Over: 2 hrs; Site: left antecubital;      vc1 

01:00 Drug: Magnesium Sulfate 1 grams Route: IVPB; Infused Over: 1 hrs; Site: left            vc1 

      antecubital;                                                                                

01:00 Drug: predniSONE 20 mg Route: PO;                                                       vc1 

01:01 Drug: Cefepime 2 grams Route: IVPB; Rate: 200 ml/hr; Infused Over: 30 mins; Site: right vc1 

      antecubital;                                                                                

01:22 Drug: NS 0.9% 1000 ml Route: IV; Rate: 100 ml/hr; Site: right antecubital;              1 

                                                                                                  

                                                                                                  

Medication:                                                                                       

01:04 VIS not applicable for this client.                                                     vc1 

                                                                                                  

Intake:                                                                                           

                                                                                             

20:37 PO: 0ml; Total: 0ml.                                                                    tw5 

22:59 IV: 500ml; Total: 500ml.                                                                bm7 

                                                                                                  

Output:                                                                                           

20:37 Urine: 0ml; Total: 0ml.                                                                 tw5 

                                                                                                  

Outcome:                                                                                          

22:47 Decision to Hospitalize by Provider.                                                    cp  

                                                                                             

01:03 Condition: good                                                                         vc1 

      Instructed on the need for admit.                                                           

01:57 Admitted to Med/surg accompanied by nurse, accompanied by tech, via stretcher, room     vc1 

      229, Report called to  Receiving nurse                                                      

01:58 Patient left the ED.                                                                    vc1 

                                                                                                  

Signatures:                                                                                       

Dispatcher [employer]                           EDMS                                                 

Yung Hawthorne PA PA cp Martinez, Maria                              5                                                  

Yancy Nieto RN                  RN   bm7                                                  

Rachel Gordon Tiffany                                5                                                  

Shawna Torres RN                    RN   vc1                                                  

                                                                                                  

Corrections: (The following items were deleted from the chart)                                    

                                                                                             

23:42 23:34 Pulse 99bpm; Resp 15bpm; Pulse Ox 98% RA; 5                                     5 

                                                                                                  

**************************************************************************************************

## 2022-08-12 NOTE — EDPHYS
Physician Documentation                                                                           

 [facility]                                                                 

Name: Herminia Amaro                                                                             

Age: 57 yrs                                                                                       

Sex: Female                                                                                       

: 1965                                                                                   

MRN: [id number]                                                                                   

Arrival Date: 2022                                                                          

Time: 20:28                                                                                       

Account#: [id number]                                                                            

Bed 7                                                                                             

Private MD:                                                                                       

ED Physician Eduardo Colin                                                                         

HPI:                                                                                              

                                                                                             

20:40 This 57 yrs old Female presents to ER via EMS with complaints of Fall Injury, Rash,     cp  

      Fever.                                                                                      

20:40 Details of fall: The patient fell from seated position, toilet. Onset: The              cp  

      symptoms/episode began/occurred today. Associated injuries: The patient sustained left      

      foot, painful injury.                                                                       

20:40 The patient's rash thought to be caused by medication, took Bactrim for cellulitis of   cp  

      feet for 2 weeks. rash started this past Wednesday. Bactrim was discontinued and            

      patient now taking clindamycin and keflex.                                                  

20:40 Associated signs and symptoms: Pertinent positives: fever, general weakness, Pertinent  cp  

      negatives: difficulty breathing, swelling of lips, swelling of throat, swelling of          

      tongue, vomiting.                                                                           

                                                                                                  

Historical:                                                                                       

- Allergies:                                                                                      

20:42 Augmentin;                                                                              tw5 

20:42 Codeine;                                                                                tw5 

20:42 Bactrim;                                                                                tw5 

- PMHx:                                                                                           

20:42 guillain-barre syndrome; Hypertensive disorder;                                         tw5 

                                                                                                  

- Immunization history: Last tetanus immunization: none per patient choice.                       

- Social history:: Smoking status: Patient denies any tobacco usage or history of.                

                                                                                                  

                                                                                                  

ROS:                                                                                              

20:45 Constitutional: Negative for body aches, chills, fever, poor PO intake.                 cp  

20:45 Eyes: Negative for injury, pain, redness, and discharge.                                cp  

20:45 ENT: Negative for drainage from ear(s), ear pain, sore throat, difficulty swallowing,       

      difficulty handling secretions.                                                             

20:45 Cardiovascular: Negative for chest pain, palpitations.                                      

20:45 Respiratory: Negative for cough, shortness of breath, wheezing.                             

20:45 Abdomen/GI: Negative for abdominal pain, nausea, vomiting, and diarrhea.                    

20:45 MS/extremity: Positive for pain, of the left foot.                                          

20:45 Skin: Positive for rash, diffusely.                                                         

20:45 Neuro: Positive for weakness, Negative for altered mental status, dizziness, headache,      

      syncope.                                                                                    

20:45 All other systems are negative.                                                             

                                                                                                  

Exam:                                                                                             

20:50 Constitutional: The patient appears in no acute distress, alert, awake,                 cp  

      non-diaphoretic, non-toxic, well developed, well nourished, obese.                          

20:50 Head/Face:  Normocephalic, atraumatic.                                                  cp  

20:50 Eyes: Periorbital structures: appear normal, Pupils: equal, round, and reactive to          

      light and accomodation, Extraocular movements: intact throughout, Conjunctiva: normal,      

      no exudate, no injection, Sclera: no appreciated abnormality, Lids and lashes: appear       

      normal, bilaterally.                                                                        

20:50 ENT: External ear(s): are unremarkable, Nose: is normal, Mouth: Lips: dry, Oral mucosa:     

      pink and intact, moist, Tongue: is normal, Posterior pharynx: Airway: no evidence of        

      obstruction, patent.                                                                        

20:50 Neck: ROM/movement: is normal, is supple, without pain, no range of motions                 

      limitations, no meningismus, no nuchal rigidity.                                            

20:50 Chest/axilla: Palpation: is normal, no crepitus, no tenderness.                             

20:50 Cardiovascular: Rate: tachycardic, Rhythm: regular, Edema: ankle edema, that is mild,       

      JVD: is not appreciated.                                                                    

20:50 Respiratory: the patient does not display signs of respiratory distress,  Respirations:     

      normal, no use of accessory muscles, no retractions, labored breathing, is not present,     

      Breath sounds: are clear throughout, no decreased breath sounds, no stridor, no             

      wheezing.                                                                                   

20:50 Abdomen/GI: Bowel sounds: active, all quadrants, Palpation: abdomen is soft and             

      non-tender, in all quadrants, involuntary guarding, is not appreciated.                     

20:50 Skin: cellulitis, that is mild, on the  right foot and left foot, rash can be described     

      as erythematous, papular, and is diffusely located.                                         

20:50 Neuro: Orientation: to person, place \T\ time. Mentation: is normal, Cerebellar function:   

      is grossly normal, Motor: moves all fours, strength is normal, Sensation: is normal.        

22:25 ECG was reviewed by the Attending Physician.                                            cp  

                                                                                                  

Vital Signs:                                                                                      

20:37  / 66; Pulse 105; Resp 18; Temp 98.5; Pulse Ox 100% ; Weight 129.27 kg; Height 5  tw5 

      ft. 6 in. (167.64 cm); Pain 3/10;                                                           

21:34  / 66; Pulse 100; Resp 18; Pulse Ox 100% ;                                        vc1 

23:34  / 85; Pulse 99; Resp 15; Pulse Ox 98% on R/A;                                    mh5 

                                                                                             

01:05  / 56; Pulse 106; Resp 15; Pulse Ox 98% ;                                         vc1 

                                                                                             

20:37 Body Mass Index 46.00 (129.27 kg, 167.64 cm)                                            tw5 

                                                                                                  

Glidden Coma Score:                                                                               

                                                                                             

20:37 Eye Response: spontaneous(4). Verbal Response: oriented(5). Motor Response: obeys       tw5 

      commands(6). Total: 15.                                                                     

                                                                                                  

Trauma Score (Adult):                                                                             

20:37 Eye Response: spontaneous(1); Verbal Response: oriented(1); Motor Response: obeys       tw5 

      commands(2); Systolic BP: > 89 mm Hg(4); Respiratory Rate: 10 to 29 per min(4); Glidden     

      Score: 15; Trauma Score: 12                                                                 

20:37 Eye Response: spontaneous(1); Verbal Response: oriented(1); Motor Response: obeys       tw5 

      commands(2); Systolic BP: > 89 mm Hg(4); Respiratory Rate: 10 to 29 per min(4); Glidden     

      Score: 15; Trauma Score: 12                                                                 

                                                                                                  

MDM:                                                                                              

20:46 Patient medically screened.                                                               

22:45 Physician consultation: Marino Coronado was contacted at 22:45, regarding admission, to the  cp  

      telemetry unit. patient's condition, and will see patient in ED, shortly.                   

                                                                                             

00:00 Post IV fluid administration reassessment for Sepsis: Client not prescribed the 30      cp  

      mL/kg IVF due to: Amount of IVF prescribed: 2100 30 cc/kg based on ideal body weight of     

      70 kg Focused Assessment performed: 2022 at 00:00 Heart: Regular rate/rhythm     

      noted. Lungs: Noted to be clear bilaterally. Current vital signs reviewed: Yes. Neuro:      

      no change Cardio: Cardiovascular examination improved from previous exam. Heart rate        

      and blood pressure have improved. Respiratory: no change.                                   

00:05 ED course: VSS, Patient qualifies for Severe Sepsis: (A) Cellulitis as source of        cp  

      infection, (B) HR of 105 initially, WBC: 27.5, (C) creatine of 2.4, troponin 235.9.         

00:30 Data reviewed: vital signs, nurses notes, lab test result(s), EKG, radiologic studies,  cp  

      plain films.                                                                                

00:30 Test interpretation: by ED physician or midlevel provider: ECG, plain radiologic        cp  

      studies.                                                                                    

                                                                                                  

                                                                                             

20:40 Order name: Basic Metabolic Panel; Complete Time: 22:02                                 cp  

                                                                                             

22:02 Interpretation: Normal except: ; K 5.3; GLUC 205; BUN 33; CRE 2.40; GFR 23; CA    cp  

      8.9.                                                                                        

                                                                                             

20:40 Order name: CBC with Diff; Complete Time: 22:28                                         cp  

                                                                                             

21:45 Interpretation: Normal except: WBC 27.5; RBC 5.16; HGB 15.3; HCT 47.1; SONALI% 94.5; LYM%  cp  

      1.4; MN% 2.5; NEUT A 26.0; LYMA 0.4.                                                        

                                                                                             

20:40 Order name: Magnesium; Complete Time: 22:02                                             cp  

                                                                                             

22:02 Interpretation: Abnormal: MG 1.6.                                                       cp  

                                                                                             

20:40 Order name: Troponin HS; Complete Time: 22:02                                           cp  

                                                                                             

22:03 Interpretation: Abnormal: Troponin .9.                                            cp  

                                                                                             

20:40 Order name: Urine Microscopic Only; Complete Time: 01:06                                cp  

                                                                                             

01:06 Interpretation: Normal except: UBACT 20-50.                                             cp  

                                                                                             

20:40 Order name: Procalcitonin; Complete Time: 23:12                                         cp  

                                                                                             

20:40 Order name: XRAY Foot LEFT 3 View; Complete Time: 22:28                                 cp  

                                                                                             

21:46 Order name: Blood Culture Adult (2)                                                     cp  

                                                                                             

21:46 Order name: Lactate; Complete Time: 23:45                                               cp  

                                                                                             

00:23 Interpretation: LAC 1.8; Reviewed.                                                      cp  

                                                                                             

22:07 Order name: Manual Differential; Complete Time: 22:28                                   EDMS

                                                                                             

22:28 Interpretation: Normal except: SEGS 86; BANDS [F] 3; LYM 3.                             cp  

                                                                                             

22:58 Order name: SARS RAPID; Complete Time: 23:45                                            mw2 

                                                                                             

00:15 Order name: Urine Dipstick-Ancillary; Complete Time: 00:23                              EDMS

                                                                                             

20:40 Order name: EKG; Complete Time: 20:41                                                   cp  

                                                                                             

20:40 Order name: Cardiac monitoring; Complete Time: 21:16                                    cp  

                                                                                             

20:40 Order name: EKG - Nurse/Tech; Complete Time: 22:59                                      cp  

08                                                                                             

20:40 Order name: IV Saline Lock; Complete Time: 21:16                                        cp  

                                                                                             

20:40 Order name: Labs collected and sent; Complete Time: 21:16                               cp  

                                                                                             

20:40 Order name: O2 Per Protocol; Complete Time: 21:16                                       cp  

                                                                                             

20:40 Order name: O2 Sat Monitoring; Complete Time: 21:17                                     cp  

                                                                                             

20:40 Order name: Urine Dipstick-Ancillary (obtain specimen); Complete Time: 00:15            cp  

                                                                                                  

EC/12                                                                                             

22:25 Rate is 103 beats/min. Rhythm is regular. CO interval is normal. QRS interval is        cp  

      normal. QT interval is normal. T waves are Inverted in lead aVR. Interpreted by me.         

      Reviewed by me.                                                                             

                                                                                                  

Administered Medications:                                                                         

21:29 Drug: NS 0.9% 500 ml Route: IV; Rate: bolus; Site: left antecubital;                    [facility] 

22:59 Follow up: IV Status: Completed infusion; IV Intake: 500ml                              [facility] 

21:50 Not Given (Physician Discretion): NS 0.9% 500 ml IV at 125 ml/hr continuous             cp  

23:05 Drug: NS 0.9% (30 ml/kg) 30 ml/kg Route: IV; Rate: bolus; Site: left antecubital;       7 

                                                                                             

01:00 Drug: vancoMYCIN 1 grams Route: IVPB; Infused Over: 2 hrs; Site: left antecubital;      vc1 

01:00 Drug: Magnesium Sulfate 1 grams Route: IVPB; Infused Over: 1 hrs; Site: left            vc1 

      antecubital;                                                                                

01:00 Drug: predniSONE 20 mg Route: PO;                                                       vc1 

01:01 Drug: Cefepime 2 grams Route: IVPB; Rate: 200 ml/hr; Infused Over: 30 mins; Site: right vc1 

      antecubital;                                                                                

01:22 Drug: NS 0.9% 1000 ml Route: IV; Rate: 100 ml/hr; Site: right antecubital;              vc1 

                                                                                                  

                                                                                                  

Disposition:                                                                                      

06:12 Co-signature as Attending Physician, Eduardo OLMOS was immediately available on-site ms3 

      in the Emergency Department for consultation in the care of the patient..                   

                                                                                                  

Disposition Summary:                                                                              

22 22:47                                                                                    

Hospitalization Ordered                                                                           

      Hospitalization Status: Inpatient Admission                                             cp  

      Provider: Jorje Ledesma cp  

      Location: [facility] (Inpatient)                                                 cp  

      Condition: Stable                                                                       cp  

      Problem: new                                                                            cp  

      Symptoms: have improved                                                                 cp  

      Bed/Room Type: Standard                                                                 cp  

      Room Assignment: 229(22 00:32)                                                    mw  

      Diagnosis                                                                                   

        - Sepsis, unspecified organism                                                        cp  

        - Cellulitis of left lower limb                                                       cp  

        - Cellulitis of right lower limb                                                      cp  

        - Acute kidney failure, unspecified                                                   cp  

        - Hypomagnesemia                                                                      cp  

      Forms:                                                                                      

        - Medication Reconciliation Form                                                      cp  

        - SBAR form                                                                           cp  

Signatures:                                                                                       

Dispatcher [employer]                           EDMS                                                 

Janene Gallagher RN                        RN                                                      

Marino Coronado, FNP-C                      FNP-Cla1                                                  

Yung Hawthorne PA PA   cp                                                   

Eduardo Colin,                         DO   ms3                                                  

Yancy Niteo, RN                  RN   bm7                                                  

Marifer Elias                                tw5                                                  

Shawna Torres RN                    RN   vc1                                                  

                                                                                                  

Corrections: (The following items were deleted from the chart)                                    

                                                                                             

22:31 20:44 Head C Spine MPR Wo Con+CT.RAD.BRZ ordered. EDMS                                  EDMS

                                                                                             

00:32  22:47 cp                                                                            

                                                                                             

22:27  20:40 Details of fall: The patient fell from an upright position, while standing, cp  

      cp                                                                                          

                                                                                                  

**************************************************************************************************

## 2022-08-13 LAB
ALBUMIN SERPL BCP-MCNC: 2.4 G/DL (ref 3.4–5)
ALP SERPL-CCNC: 61 U/L (ref 45–117)
ALT SERPL W P-5'-P-CCNC: 30 U/L (ref 12–78)
AST SERPL W P-5'-P-CCNC: 19 U/L (ref 15–37)
BUN BLD-MCNC: 36 MG/DL (ref 7–18)
COHGB MFR BLDA: 1.1 % (ref 0–1.5)
CREAT UR-SCNC: 127 MG/DL (ref 20–320)
CRP SERPL-MCNC: 153 MG/L (ref ?–3)
GLUCOSE SERPLBLD-MCNC: 156 MG/DL (ref 74–106)
HCT VFR BLD CALC: 44.6 % (ref 36–45)
HDLC SERPL-MCNC: 31 MG/DL (ref 40–60)
LDLC SERPL CALC-MCNC: 86 MG/DL (ref ?–130)
LYMPHOCYTES # SPEC AUTO: 0.6 K/UL (ref 0.7–4.9)
MAGNESIUM SERPL-MCNC: 1.7 MG/DL (ref 1.8–2.4)
MCV RBC: 91.1 FL (ref 80–100)
NT-PROBNP SERPL-MCNC: 675 PG/ML (ref ?–125)
OXYHGB MFR BLDA: 94.2 % (ref 94–97)
PMV BLD: 8.9 FL (ref 7.6–11.3)
POTASSIUM SERPL-SCNC: 4.8 MMOL/L (ref 3.5–5.1)
POTASSIUM UR-SCNC: 41 MMOL/L (ref 20–40)
PROT UR-MCNC: 97.5 MG/DL (ref ?–11.9)
RBC # BLD: 4.89 M/UL (ref 3.86–4.86)
SAO2 % BLDA: 96.6 % (ref 92–98.5)
SODIUM UR-SCNC: < 15 MMOL/L (ref 27–287)
TROPONIN I SERPL HS-MCNC: 280.6 PG/ML (ref ?–58.9)

## 2022-08-13 RX ADMIN — ACETAMINOPHEN PRN MG: 500 TABLET, FILM COATED ORAL at 09:07

## 2022-08-13 RX ADMIN — Medication SCH ML: at 09:00

## 2022-08-13 RX ADMIN — SODIUM CHLORIDE SCH MLS: 0.9 INJECTION, SOLUTION INTRAVENOUS at 14:22

## 2022-08-13 RX ADMIN — ACETAMINOPHEN PRN MG: 500 TABLET, FILM COATED ORAL at 14:22

## 2022-08-13 RX ADMIN — FAMOTIDINE SCH MG: 10 INJECTION, SOLUTION INTRAVENOUS at 22:36

## 2022-08-13 RX ADMIN — ACETAMINOPHEN PRN MG: 500 TABLET, FILM COATED ORAL at 23:59

## 2022-08-13 RX ADMIN — ATORVASTATIN CALCIUM SCH: 40 TABLET, FILM COATED ORAL at 21:00

## 2022-08-13 RX ADMIN — HEPARIN SODIUM SCH UNIT: 5000 INJECTION, SOLUTION INTRAVENOUS; SUBCUTANEOUS at 22:35

## 2022-08-13 RX ADMIN — Medication SCH ML: at 22:35

## 2022-08-13 RX ADMIN — ACETAMINOPHEN PRN MG: 500 TABLET, FILM COATED ORAL at 02:40

## 2022-08-13 RX ADMIN — ONDANSETRON PRN MG: 2 INJECTION INTRAMUSCULAR; INTRAVENOUS at 09:07

## 2022-08-13 RX ADMIN — SODIUM CHLORIDE SCH MLS: 0.9 INJECTION, SOLUTION INTRAVENOUS at 02:20

## 2022-08-13 RX ADMIN — SODIUM CHLORIDE SCH MLS: 0.9 INJECTION, SOLUTION INTRAVENOUS at 22:35

## 2022-08-13 RX ADMIN — HEPARIN SODIUM SCH UNIT: 5000 INJECTION, SOLUTION INTRAVENOUS; SUBCUTANEOUS at 09:07

## 2022-08-13 RX ADMIN — DIPHENHYDRAMINE HYDROCHLORIDE PRN MG: 50 INJECTION, SOLUTION INTRAMUSCULAR; INTRAVENOUS at 16:16

## 2022-08-13 RX ADMIN — ASPIRIN SCH MG: 81 TABLET, COATED ORAL at 09:07

## 2022-08-13 NOTE — RAD REPORT
EXAM DESCRIPTION:  US - Renal Ultrasound-Complete - 8/13/2022 6:31 am

 

CLINICAL HISTORY:  Acute renal failure

 

COMPARISON:  None

 

FINDINGS:  The right kidney measures 10 cm with a mildly increased echotexture.

 

The left kidney measures 9 cm with a mildly increased echotexture.

 

Hydronephrosis is not seen.

 

No gross abnormality of bladder

 

IMPRESSION:  Mildly increased renal echotexture may indicate parenchymal disease

## 2022-08-13 NOTE — CON
Date of Consultation:  08/13/2022



Reason For Consultation:  Rash and wounds on lower extremity.



History Of Present Illness:  The patient is a 57-year-old female with multiple medical history who pr
esented to the emergency room last night with fever, rash, feeling weak, and she fell at home.  She w
as evaluated in the emergency room, was found to have severe sepsis.  A protocol was followed.  The p
jordy was admitted and I was consulted for some wounds on her foot to make sure they were not the so
urce of the sepsis.  The patient saw me in my office on Wednesday, and she was supposed to follow up 
[facility].  She had been on Bactrim and clindamycin for 2 weeks, 
and the rash started Wednesday after she saw me in my office.  States that it was a maculopapular sravanthi
h and has progressed to all over her body including the trunk, face, extremities, back, perineum.  Th
e patient is being treated with steroids, as well as IV antibiotics.  She denies any sore throat, run
ny nose, cough, headaches, or dizziness.  No chest pain.  No fever or chills.  At this time, she did 
have a T-max of 101 degrees at home.



Review of Systems:

Otherwise unremarkable.



Past Medical History:  Hypertension, hyperlipidemia, Guillain-[place] syndrome.



Past Surgical History:  Carpal tunnel surgery and left foot surgery.



Allergies:  AMOXICILLIN, CLAVULANIC ACID, CODEINE, SULFA, TRIMETHOPRIM.



Social History:  The patient does not smoke or drink.



Family History:  Significant for diabetes.



Physical Examination:

Vital Signs:  Currently are stable.  Temperature was 97.6 at 4 o'clock this morning.  There was 1 in 
the computer just reported which is 100.9. 

General:  She is awake, alert, and oriented x3. 

Head and Neck:  The patient has a rash involving the entire face, is red.  Cranial nerves 2 through 1
2 are grossly within normal limits.  There are no neck masses.  No JVD.  Throat is clear.  There is n
o evidence of mucocutaneous rash inside the mucous membranes. 

Chest:  Clear. 

Heart:  S1, S2. 

Abdomen:  Soft. 

Extremities:  Diminished dorsalis pedis and posterior tibial pulses and dry skin present on both lowe
r extremities.  There are scabs on both feet where it appears that blister may have burst but there i
s no per se open wound at this time.  There is some edema with a rash as well and it is patchy in the
 lower extremity and is complete in the upper part of her body.



Laboratory Data:  White count was 27.5 currently is 30.0.  There is a left shift.  Sedimentation rate
 is 6.  Chemistries reviewed.  BUN and creatinine are 36 and 2.69.  Lactic acid was 1.8 and procalcit
onin is 1.29.  She had an x-ray of the foot which was negative.  The renal ultrasound showed parenchy
mal disease.



Assessment:  A 57-year-old female with multiple medical problems with a maculopapular rash throughout
 her body, probably a reaction to the sulfa and Bactrim.



Recommendation:  Medical management per Dr. Ledesma.  She is on steroid, IV antibiotics, supportive flui
ds.  I will order Lidex for the dry skin in the lower extremity which may help.  The patient has been
 consulted with the Cardiology and Nephrology service, and at this time, however, the patient does no
t have any need for surgical intervention.  The patient can follow up with me in the wound healing ce
nter upon discharge.  Plan of care discussed in detail with the patient.





AS/MODL

DD:  08/13/2022 09:36:30Voice ID:  [id number]

DT:  08/13/2022 14:48:15Report ID:  [id number]

## 2022-08-13 NOTE — P.CNS
Date of Consult: 08/13/22


Reason for Consult: Renal failure


Requesting Physician: Jorje Ledesma


Chief Complaint: Severe sepsis, ARF


History of Present Illness: 





57F w/ PMHx of Htn, HLD, & GBS who p/w rash, fevers and feeling unwell/weak.  

She recently took bactrim for BLE cellulitis then 3 days ago, started having 

diffuse rash & also developed fever.  Referred to Nephrology for NICK.  Baseline 

SCr 1.1 as of 8/8/2022. SCr on admission is 2.4, currently at 2.7.  Urinalysis 

w/ elevated specific gravity & +proteinuria but no hematuria or pyuria. Bladder 

scan done today showed +urinary retention.  Ortiz catheter inserted.





Allergies





amoxicillin [From Augmentin] Allergy (Unknown, Verified 08/13/22 02:07)


   unknown


clavulanic acid [From Augmentin] Allergy (Unknown, Verified 08/13/22 02:07)


   unknown


codeine Allergy (Verified 08/13/22 02:07)


   unknown


sulfamethoxazole [From Bactrim] Adverse Reaction (Verified 08/13/22 02:07)


   Hives/Rash


trimethoprim [From Bactrim] Adverse Reaction (Verified 08/13/22 02:07)


   Hives/Rash





Home Medications: 








Famotidine 20 mg PO BID 08/13/22 


Olmesartan Medoxomil 40 mg PO DAILY 08/13/22 








- Past Medical/Surgical History


Diabetic: No


-: Hypertension


-: Hyperlipidemia


-: Guillain-Barr syndrome


-: Left foot


-: Carpal tunnel


Psychosocial/ Personal History: Patient is disabled, lives at home with her 







- Family History


  ** Mother


Medical History: Diabetes





- Social History


Alcohol use: No


CD- Drugs: No


Caffeine use: Yes


Place of Residence: Home





Review of Systems


General: Fever, Weakness


Eyes: Unremarkable


ENT: Unremarkable


Respiratory: Unremarkable


Cardiovascular: Unremarkable


Gastrointestinal: Unremarkable


Genitourinary: Unremarkable


Musculoskeletal: Unremarkable


Integumentary: Unremarkable (Diffuse rash)


Neurological: Unremarkable


Lymphatics: Unremarkable





Physical Examination














Temp Pulse Resp BP Pulse Ox


 


 100.9 F   76   18   142/64 H  97 


 


 08/13/22 09:07  08/13/22 04:00  08/13/22 04:00  08/13/22 04:00  08/13/22 04:00








General: Other (appears as her stated age)


HEENT: Atraumatic, Normocephalic


Neck: Supple, JVD not distended


Respiratory: Other (symmetric chest expansion)


Cardiovascular: No rubs, No murmurs


Gastrointestinal: Soft and benign, No rebound


Musculoskeletal: No clubbing


Integumentary: Other (+diffuse erythematous rash; +facial erythema)


Neurological: Normal speech, Normal tone


Urinary: Ortiz catheter


External genitalia: Deferred


Rectal: Deferred


Laboratory Data (last 24 hrs)





08/12/22 21:12: WBC 27.5 H* D, Hgb 15.3 H, Hct 47.1 H, Plt Count 266


08/12/22 21:12: Sodium 131 L, Potassium 5.3 H, BUN 33 H, Creatinine 2.40 H D, 

Glucose 205 H, Magnesium 1.6 L





Conclusions/Impression: 





# NICK 2/2 prerenal state/septic ATN + urinary retention


Baseline SCr 1.1 as of 8/8/2022


SCr on admission is 2.4, currently at 2.7


Urinalysis w/ elevated specific gravity & +proteinuria but no hematuria or 

pyuria


F/u random urine chem, upcr, iPTH, 25OHD


BNP elevated


Bladder scan on 8/13 +urinary retention


Ortiz inserted


Cont IV fluid


Monitor I/O, renal panel





# Hyperkalemia


Improved


IV fluid + ortiz insertion as above





# Hyponatremia


Mild, monitor





# Severe sepsis 2/2 DM foot wound


Abx per primary team





# Diffuse rash likely 2/2 drug reaction


Bactrim d/c'ed previously





# Htn


Cont current med regimen





# Low serum bicarb, acidosis


Bld aliza pH wnl, monitor

## 2022-08-13 NOTE — P.HP
Certification for Inpatient


Patient admitted to: Inpatient


With expected LOS: >2 Midnights


Patient will require the following post-hospital care: None


Practitioner: I am a practitioner with admitting privileges, knowledge of 

patient current condition, hospital course, and medical plan of care.


Services: Services provided to patient in accordance with Admission requirements

found in Title 42 Section 412.3 of the Code of Federal Regulations





Patient History


Date of Service: 08/13/22


Reason for admission: Severe sepsis, ARF


History of Present Illness: 





57-year-old female with history of hypertension, hyperlipidemia, Guillain-Barr 

syndrome presented to the emergency department for rash, fevers and feeling 

unwell/weak.  Approximately 2 weeks ago she was placed on Bactrim for suspected 

cellulitis of her lower extremities she took that medication until the 10th of 

this month when she began to develop a rash she was seen on the 11th in the 

emergency department and diagnosed with adverse reaction to Bactrim/drug rash 

and discharged home advised to discontinue the Bactrim and provided with new 

antibiotics Keflex/clindamycin.  She reports the rash has continued to get worse

and she has been feeling more unwell over the last few days.  She was evaluated 

here in the emergency department today her labs show acute renal failure with 

creatinine of 2.4 significant leukocytosis, bandemia she reports fevers of up to

101 degrees today at home although she has had no measured fevers here.  Patient

was seen today for severe sepsis given tachycardia, leukocytosis and acute 

kidney failure, she was given broad-spectrum antibiotics with 

vancomycin/cefepime in the emergency department blood cultures were obtained 

patient with rash that began on her back/chest and has spread to include all of 

her extremities and face morbilliform rash with petechiae/purpura no ulcerations

or blistering, no involvement of the mucous membranes noted eosinophils, platel

ets and hemoglobin within normal limits at this time.  Doubt DRESS, SJS/TEN.  

Will admit for further evaluation and management of severe sepsis, acute renal 

failure, morbilliform rash/drug reaction.





- Past Medical/Surgical History


-: Hypertension


-: Hyperlipidemia


-: Guillain-Barr syndrome


-: Left foot


-: Carpal tunnel


Psychosocial/ Personal History: Patient is disabled, lives at home with her 







- Family History


  ** Mother


-: Diabetes





- Social History


Smoking Status: Never smoker


Alcohol use: No


CD- Drugs: No


Caffeine use: Yes


Place of Residence: Home





Review of Systems


10-point ROS is otherwise unremarkable


General: Fever, Chills, Weakness, Malaise


Integumentary: Rash, As per HPI





Physical Examination





- Physical Exam


General: Alert, In no apparent distress, Oriented x3


HEENT: Atraumatic, PERRLA, Mucous membr. moist/pink, EOMI, Sclerae nonicteric


Neck: Supple, 2+ carotid pulse no bruit, No LAD, Without JVD or thyroid 

abnormality


Respiratory: Clear to auscultation bilaterally, Normal air movement


Cardiovascular: Regular rate/rhythm, Normal S1 S2


Gastrointestinal: Normal bowel sounds, No tenderness


Musculoskeletal: No tenderness


Integumentary: Other (Morbilliform rash with petechiae/purpura.  No ulcerations 

or mucosal involvement)


Neurological: Normal gait, Normal speech, Normal strength at 5/5 x4 extr, Normal

tone, Normal affect





- Studies


Laboratory Data (last 24 hrs)





08/12/22 21:12: WBC 27.5 H* D, Hgb 15.3 H, Hct 47.1 H, Plt Count 266


08/12/22 21:12: Sodium 131 L, Potassium 5.3 H, BUN 33 H, Creatinine 2.40 H D, 

Glucose 205 H, Magnesium 1.6 L








Assessment and Plan





- Plan


Assessment:


Severe sepsis secondary to cellulitis/diabetic foot wound


Acute renal failure related to severe sepsis


Morbilliform rash/vasculitis


Elevated troponin


Hypertension


Hyperlipidemia





Plan:


Severe sepsis secondary to cellulitis/diabetic foot wound: Patient started on 

broad-spectrum antibiotics including vancomycin/cefepime, she was on Bactrim at 

home for about a week and a half before she developed her morbilliform rash, she

has since been on Keflex and clindamycin.  Reaction to Bactrim possibly 

contributing to leukocytosis, renal dysfunction.  Bactrim was discontinued on 

the 11th, patient reports mild improvement in urticaria/rash.  Will consult 

general surgery to evaluate wounds to lower extremity.  Blood cultures were 

obtained in the emergency department.  Lactate 1.8 no hypotension no septic 

shock at this time.


Acute renal failure related to severe sepsis: Continue as above, renal 

ultrasound ordered, nephrology consulted, recent use of Bactrim could also be 

contributing.  Appreciate further input from nephrology.


Morbilliform rash/vasculitis: Suspect morbilliform rash/vasculitis from Bactrim,

there is no involvement of the mucous membranes or ulcerations present. 

Hemoglobin, platelet, eosinophils normal doubt DRESS, SJS, TEN.  We will start 

patient on course of oral steroids.


Elevated troponin: Suspect related to demand ischemia from sepsis, possibly 

vasculitis from Bactrim.  Cardiology consulted echocardiogram ordered we will 

trend troponins.  No STEMI criteria present on EKG.  Patient without chest pain 

or shortness of breath.


Hypertension: Blood pressure acceptable in this time, continue home medications 

when appropriate hold ACE/ARB.


Hyperlipidemia: Continue home medications.





DVT PPX: Heparin


Code status: Full


Discharge Plan: Home


Plan to discharge in: 72 Hours





- Advance Directives


Does patient have a Living Will: No


Does patient have a Durable POA for Healthcare: No





- Code Status/Comfort Care


Code Status Assessed: Yes (Full code)


Critical Care: No


Time Spent Managing Pts Care (In Minutes): 70

## 2022-08-14 LAB
ALBUMIN SERPL BCP-MCNC: 2.1 G/DL (ref 3.4–5)
ALP SERPL-CCNC: 61 U/L (ref 45–117)
ALT SERPL W P-5'-P-CCNC: 28 U/L (ref 12–78)
AST SERPL W P-5'-P-CCNC: 14 U/L (ref 15–37)
BUN BLD-MCNC: 41 MG/DL (ref 7–18)
GLUCOSE SERPLBLD-MCNC: 106 MG/DL (ref 74–106)
HCT VFR BLD CALC: 37.6 % (ref 36–45)
LYMPHOCYTES # SPEC AUTO: 1 K/UL (ref 0.7–4.9)
MAGNESIUM SERPL-MCNC: 2.2 MG/DL (ref 1.8–2.4)
MCV RBC: 89.8 FL (ref 80–100)
MORPHOLOGY BLD-IMP: (no result)
PMV BLD: 8.5 FL (ref 7.6–11.3)
POTASSIUM SERPL-SCNC: 5.1 MMOL/L (ref 3.5–5.1)
RBC # BLD: 4.18 M/UL (ref 3.86–4.86)

## 2022-08-14 RX ADMIN — Medication SCH ML: at 09:00

## 2022-08-14 RX ADMIN — ACETAMINOPHEN PRN MG: 500 TABLET, FILM COATED ORAL at 20:24

## 2022-08-14 RX ADMIN — SODIUM CHLORIDE SCH MLS: 9 INJECTION, SOLUTION INTRAVENOUS at 00:00

## 2022-08-14 RX ADMIN — HEPARIN SODIUM SCH UNIT: 5000 INJECTION, SOLUTION INTRAVENOUS; SUBCUTANEOUS at 09:35

## 2022-08-14 RX ADMIN — HEPARIN SODIUM SCH UNIT: 5000 INJECTION, SOLUTION INTRAVENOUS; SUBCUTANEOUS at 20:24

## 2022-08-14 RX ADMIN — SODIUM CHLORIDE SCH MLS: 0.9 INJECTION, SOLUTION INTRAVENOUS at 18:44

## 2022-08-14 RX ADMIN — ASPIRIN SCH MG: 81 TABLET, COATED ORAL at 09:35

## 2022-08-14 RX ADMIN — ATORVASTATIN CALCIUM SCH: 40 TABLET, FILM COATED ORAL at 20:25

## 2022-08-14 RX ADMIN — SODIUM CHLORIDE SCH MLS: 0.9 INJECTION, SOLUTION INTRAVENOUS at 09:34

## 2022-08-14 RX ADMIN — SODIUM CHLORIDE SCH MLS: 0.9 INJECTION, SOLUTION INTRAVENOUS at 18:51

## 2022-08-14 RX ADMIN — Medication SCH ML: at 20:25

## 2022-08-14 RX ADMIN — FAMOTIDINE SCH MG: 10 INJECTION, SOLUTION INTRAVENOUS at 20:27

## 2022-08-14 RX ADMIN — FAMOTIDINE SCH MG: 10 INJECTION, SOLUTION INTRAVENOUS at 09:35

## 2022-08-14 NOTE — CON
Date of Consultation:  08/13/2022



Reason For Consultation:  Elevated troponin.



History Of Present Illness:  57-year-old female, history of hypertension, 
dyslipidemia, Guillain- [place] syndrome presented to the emergency, room with 
extensive rash and fever after she took Bactrim.  She was evaluated in the 
emergency room, had cardiac enzymes checked.  Troponin was borderline elevated, 
but she denies having any chest pain or shortness of breath or any known cardiac
history.



Past Medical History:  Hypertension, obesity, dyslipidemia, Guillain- [place] 
syndrome.



Medications:  Refer to reconciliation sheet for detailed list.



Allergies:  SHE IS ALLERGIC TO SULFA DRUGS, BACTRIM, AUGMENTIN, AND PENICILLINS.



Past Surgical History:  Carpal tunnel syndrome release.



Family History:  No premature coronary artery disease or cancer.



Social History:  Does not smoke or drink.  Does not use any drugs.



Review of Systems:

All systems reviewed.  They are negative except for mentioned in HPI.



Physical Examination:

Vital Signs:  Reviewed. 

Head and Neck:  Pupils are equal, reactive to light.  Intact eye movements.  No 
JVD.  No cervical lymphadenopathy.  Neck is supple.  Thyroid is not enlarged. 

Lungs:  Clear to auscultation bilaterally.  No rhonchi, rales, or crackles.  No 
accessory muscle use. Heart:  Regular rate and rhythm.  No extra sounds. 

Abdomen:  Soft, nontender.  Bowel sounds positive.  No organomegaly.  No masses 
or hernia.  No rigidity or rebound. 

Extremities:  No edema, clubbing, cyanosis. Intact pulses. 

SKIN:  Diffuse maculopapular rash all over her skin including face. 

Neurologic:  Alert, awake, oriented x3.  No acute focal deficits appreciated. 

LYMPH NODES:  No cervical or axillary lymphadenopathy.



Investigations:  Troponin 280 and her creatinine is 2.69.



Assessment And Plan:  

1.   Elevated troponin.  No symptoms, but has risk factors.  This is likely 
demand ischemia.  Trend 2 more troponins please and I will follow the patient 
with you.

2.   Acute renal failure due to severe volume depletion.  Aggressive IV fluid 
management is recommended.  Monitor BUN and creatinine closely.

3.   Skin rash due to drug reaction and she has been managed with steroids and 
antihistamines.





SR/MODL

DD:  08/14/2022 18:05:29   Voice ID:  [id number]

DT:  08/14/2022 22:48:27   Report ID:  [id number]

NATALYA

## 2022-08-14 NOTE — P.PN
Subjective


Date of Service: 08/14/22


Subjective: No new changes, No C/O voiced, Improving





  Patient is feeling better.  Renal functions improved.  Rash feels like it is 

improved as well.  Patient feels like her strength is improved.  Still with 

leukocytosis.  Continue monitoring inflammatory markers as well.  Infectious 

Disease consultation pending as well.





Physical Examination





- Vital Signs


Temperature: 98.7 F


Blood Pressure: 145/79


Pulse: 87


Respirations: 17


Pulse Ox (%): 96





Assessment & Plan





- Problems (Diagnosis)


(1) Nonallergic cutaneous leukocytoclastic vasculitis


Current Visit: Yes   Status: Acute   





(2) NICK (acute kidney injury)


Current Visit: Yes   Status: Acute   





(3) Elevated troponin


Current Visit: Yes   Status: Acute   





(4) Allergy to trimethoprim/sulfamethoxazole


Current Visit: Yes   Status: Acute   





- Plan





Plan:


1. Continue with IV steroids


2. Gentle hydration


3. Repeat troponin


4. Echocardiogram 


5. Check inflammatory markers


6. Autoimmune antibodies


7. Repeat renal function testing


8. GI/ DVT prophylaxis








Discharge Plan: Home


Plan to discharge in: Greater than 2 days





- Advance Directives


Does patient have a Living Will: Yes


Does patient have a Durable POA for Healthcare: No





- Code Status/Comfort Care


Code Status Assessed: Yes


Code Status: Full Code


Critical Care: No


Time Spent Managing PTS Care (In Minutes): 35

## 2022-08-14 NOTE — PN
Date of Progress Note:  08/14/2022



Subjective:  Seen by bedside, doing clinically well.  Denies any chest pain or shortness of breath.  
The rash is improving slowly.



Review of Systems:

All systems reviewed, they are negative.



Physical Examination:

Vital Signs:  Reviewed. 

Head and Neck:  Pupils are equal, reactive to light.  Intact eye movements.  No JVD.  No cervical lym
phadenopathy. 

Neck is supple.  Thyroid is not enlarged. 

Lungs:  Clear to auscultation bilaterally.  No rhonchi, rales, or crackles.  No accessory muscle use.
 

Heart:  Regular rate and rhythm.  No extra sounds. 

Abdomen:  Soft, nontender.  Bowel sounds positive.  No organomegaly.  No masses or hernia.  No rigidi
ty or rebound. 

Extremities:  No edema, clubbing, cyanosis.  Intact pulses. 

Skin:  Diffuse rash that is improved comparing to yesterday. 

Neurologic:  Alert, awake, oriented x3.  No acute focal deficits appreciated. 

Lymph Nodes:  No cervical or axillary lymphadenopathy.



Investigations:  All reviewed.



Assessment And Recommendations:  

1.Elevated troponin.  As only 1 set was drawn, I will order a stat 1 now and if it continues to be w
ithin the very low levels like the first one, we will monitor and plan for outpatient workup with an 
echo and stress test.

2.Acute renal failure due to dehydration.  Aggressive IV fluid management and evaluation of kidney f
unction in the morning.





SR/MODL

DD:  08/14/2022 18:08:50Voice ID:  [id number]

DT:  08/14/2022 23:07:49Report ID:  [id number]

## 2022-08-14 NOTE — P.PN
Subjective


Date of Service: 08/14/22


Chief Complaint: Severe sepsis, ARF


Subjective: No new changes





Physical Examination





- Vital Signs


Temperature: 98.7 F


Blood Pressure: 121/57


Pulse: 87


Respirations: 18


Pulse Ox (%): 100





- Physical Exam


General: Other (appears as her stated age)


HEENT: Atraumatic, Normocephalic, Other (+facial erythema)


Neck: Supple, JVD not distended


Respiratory: Other (symmetric chest expansion)


Cardiovascular: No rubs, No murmurs


Gastrointestinal: Soft and benign, No rebound


Musculoskeletal: No clubbing


Integumentary: No warmth


Neurological: Normal speech, Normal tone


Urinary: Ortiz catheter


External genitalia: Deferred


Rectal: Deferred





Assessment And Plan





- Plan





# NICK 2/2 prerenal state/septic ATN + urinary retention


Baseline SCr 1.1 as of 8/8/2022


SCr on admission is 2.4, improved to 2.0


Urinalysis w/ elevated specific gravity & +proteinuria but no hematuria or 

pyuria


F/u random urine chem, upcr, iPTH, 25OHD


BNP elevated


Bladder scan on 8/13 +urinary retention


Cont Ortiz 


Cont IV fluid


[place] po fluid intake


Monitor I/O, renal panel





# Hyperkalemia


IV/PO hydration + ortiz as above





# Hyponatremia


Mild, monitor





# Severe sepsis 2/2 DM foot wound


Abx per primary team





# Diffuse rash likely 2/2 drug reaction


Rash also seen in palms & soles


Bactrim d/c'ed previously





# Htn


Cont current med regimen





# Low serum bicarb, acidosis


Bld aliza pH wnl, monitor





# Hx of GBS


Per other services

## 2022-08-15 LAB
ALBUMIN SERPL BCP-MCNC: 2.2 G/DL (ref 3.4–5)
ALP SERPL-CCNC: 66 U/L (ref 45–117)
ALT SERPL W P-5'-P-CCNC: 32 U/L (ref 12–78)
AST SERPL W P-5'-P-CCNC: 12 U/L (ref 15–37)
BUN BLD-MCNC: 32 MG/DL (ref 7–18)
CRP SERPL-MCNC: 141 MG/L (ref ?–3)
GLUCOSE SERPLBLD-MCNC: 120 MG/DL (ref 74–106)
HCT VFR BLD CALC: 35.4 % (ref 36–45)
LYMPHOCYTES # SPEC AUTO: 0.7 K/UL (ref 0.7–4.9)
MAGNESIUM SERPL-MCNC: 2.2 MG/DL (ref 1.8–2.4)
MCV RBC: 90.6 FL (ref 80–100)
PMV BLD: 8.1 FL (ref 7.6–11.3)
POTASSIUM SERPL-SCNC: 4.8 MMOL/L (ref 3.5–5.1)
RBC # BLD: 3.91 M/UL (ref 3.86–4.86)
TROPONIN I SERPL HS-MCNC: 579.5 PG/ML (ref ?–58.9)

## 2022-08-15 RX ADMIN — HEPARIN SODIUM SCH: 5000 INJECTION, SOLUTION INTRAVENOUS; SUBCUTANEOUS at 09:00

## 2022-08-15 RX ADMIN — FLUOCINONIDE PRN APPL: 0.5 CREAM TOPICAL at 17:11

## 2022-08-15 RX ADMIN — FAMOTIDINE SCH MG: 10 INJECTION, SOLUTION INTRAVENOUS at 21:57

## 2022-08-15 RX ADMIN — SODIUM CHLORIDE SCH: 0.9 INJECTION, SOLUTION INTRAVENOUS at 14:20

## 2022-08-15 RX ADMIN — ASPIRIN SCH MG: 81 TABLET, COATED ORAL at 10:11

## 2022-08-15 RX ADMIN — DIPHENHYDRAMINE HYDROCHLORIDE PRN MG: 50 INJECTION, SOLUTION INTRAMUSCULAR; INTRAVENOUS at 21:57

## 2022-08-15 RX ADMIN — Medication SCH ML: at 21:57

## 2022-08-15 RX ADMIN — SODIUM CHLORIDE SCH: 0.9 INJECTION, SOLUTION INTRAVENOUS at 04:07

## 2022-08-15 RX ADMIN — HEPARIN SODIUM SCH: 5000 INJECTION, SOLUTION INTRAVENOUS; SUBCUTANEOUS at 21:00

## 2022-08-15 RX ADMIN — DIPHENHYDRAMINE HYDROCHLORIDE PRN MG: 50 INJECTION, SOLUTION INTRAMUSCULAR; INTRAVENOUS at 03:59

## 2022-08-15 RX ADMIN — FAMOTIDINE SCH MG: 10 INJECTION, SOLUTION INTRAVENOUS at 10:12

## 2022-08-15 RX ADMIN — SODIUM CHLORIDE SCH MLS: 9 INJECTION, SOLUTION INTRAVENOUS at 00:21

## 2022-08-15 RX ADMIN — Medication SCH: at 09:00

## 2022-08-15 RX ADMIN — SODIUM CHLORIDE SCH MLS: 0.9 INJECTION, SOLUTION INTRAVENOUS at 07:08

## 2022-08-15 RX ADMIN — SODIUM CHLORIDE SCH MLS: 0.9 INJECTION, SOLUTION INTRAVENOUS at 22:00

## 2022-08-15 NOTE — P.PN
Subjective


Date of Service: 08/15/22


Chief Complaint: Severe sepsis, ARF


Subjective: Improving


No acute events overnight. She reports that her rash is more pruritic but 

appears less erythematous to her today.





Review of Systems


10-point ROS is otherwise unremarkable


Integumentary: Rash





Physical Examination





- Vital Signs


Temperature: 98.1 F


Blood Pressure: 149/65


Pulse: 82


Respirations: 16


Pulse Ox (%): 97





- Physical Exam


General: Alert, In no apparent distress, Oriented x3


HEENT: Atraumatic, PERRLA, Mucous membr. moist/pink, EOMI, Sclerae nonicteric


Neck: Supple, JVD not distended


Respiratory: Clear to auscultation bilaterally, Normal air movement


Cardiovascular: No edema, Regular rate/rhythm, Normal S1 S2, No gallops, No 

rubs, No murmurs


Gastrointestinal: Normal bowel sounds, Soft and benign, Non-distended, No 

tenderness, No rebound, No guarding


Musculoskeletal: No clubbing


Integumentary: Rash(es) (diffuse morbiliform rash with scattered maculaes and 

papules)


Neurological: Normal speech, Cranial nerves 3-12 intact, Abnormal speech





- Studies


Microbiology Data (last 24 hrs): 








08/13/22 00:10   Clean Catch Urine   [place] Count - Final


                            No growth.


08/13/22 00:10   Clean Catch Urine    - Final


                            No growth.








Assessment And Plan





- Plan


# Severe Sepsis likely secondary to Left Lower Extremity Cellulitis/Diabetic 

Foot Wound


# Acute Kidney Injury suspect due to Sepsis, resoved


She met SIRS criteria based on temperature > 100.9 F, WBC > 12,000 and the 

suspected source is cellulitis +/- diabetic foot ulcers.  


- Sepsis order set was initiated 


- Initial Lactate was 1.8


- Blood cultures drawn 


- Broad spectrum antibiotics started: Vancomycin + Cefepime


- In regards to fluids: 


   - 30 mL/kg of IV fluids was not administered given SBP > 90, MAP > 65, lactic

acid < 4  


- Ordered MRI left foot to evaluate for osteomyelitis





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 

Syndrome)


Her history of fever, leukocytosis, and a papular rash in combination with her 

neutrophilia is concerning for acute febrile neutrophilic dermatosis. Other 

differential diagnoses include, but are not limited to, Bactrim-induced vs 

vasculitis.


- Continue dexamethasone, diphenhydramine, famotidine


- Spoke with Vince at [facility] transfer center to discuss possible transfer with 

[facility] - awaiting call back.





# Suspect Type II NSTEMI ([place] Ischemia)


# Hypertension 


# Hyperlipidemia


- Cardiology consulted and spoke with Dr. Taveras - recommendations appreciated


- Requested serial troponin and transthoracic echocardiogram 





Saji Li M.D.

## 2022-08-15 NOTE — PN
Date of Progress Note:  08/15/2022



Subjective:  Seen at bedside, doing clinically well.  No chest pain.  Rash is improving.



Review of Systems:

No chest pain, shortness of breath, orthopnea, cough, nausea, vomiting, diarrhea.  All other systems 
reviewed are negative.



Physical Examination:

Vital Signs:  Reviewed. 

Head and Neck:  Pupils are equal, reactive to light.  Intact eye movements.  No JVD.  No cervical lym
phadenopathy. 

Neck:  Supple.  Thyroid is not enlarged. 

Lungs:  Clear to auscultation bilateral:  No rhonchi, wheezing, crackles.  No accessory muscle use. 

Heart:  Regular rate and rhythm.  No extra sounds. 

Abdomen:  Soft, nontender.  Bowel sounds positive.  No organomegaly.  No masses or hernia.  No rigidi
ty or rebound. 

Extremities:  No clubbing, cyanosis.  Intact pulses. 

Skin:  Rash has improved. 

Neurologic:  Alert, awake, oriented x3.  No clubbing is appreciated.



Investigations:  Troponin peaked at 933 and coming down.  Creatinine is 1.15.



Assessment And Recommendation:  

1.Elevated troponin, likely demand ischemia.  Given the fact that her __________went up to 900 range
, please obtain echocardiogram and will evaluate accordingly.

2.Acute renal failure due to severe dehydration from the allergic reaction that she had and this res
olved.

3.Drug allergic reaction, being monitored and managed with steroids and antihistamines.





/TOMAS

DD:  08/15/2022 14:54:45Voice ID:  [id number]

DT:  08/15/2022 19:25:17Report ID:  [id number]

## 2022-08-15 NOTE — CON
History Of Present Illness:  Lorraine Amaro is a 57-year-old female.  I was consulted for evaluation o
f possible rash from antibiotic Bactrim.  Patient was admitted to the second floor in Med-Surg.  Aniceto
es any headache, nausea, vomiting, chest pain, abdominal pain, constipation, or diarrhea.  Feels much
 better today.  She was treated for cellulitis of her right lower extremity with oral antibiotic, Scotty
trim, and later on added clindamycin. After few days of antibiotic as per patient, she has started de
veloping rash all over her body including face, upper and lower extremities, chest, abdomen, and back
.  The patient has history of allergies to amoxicillin, Augmentin, and codeine.  Patient is currently
 being treated with vancomycin and cefepime, also on high dose steroids, Pepcid, and Benadryl.



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
 wound, carpal tunnel syndrome.



Social History:  Nonsmoker, nondrinker.



Family History:  Noncontributory, except diabetes.



Medications:  Vancomycin, cefepime.  See MAR for other medications.



Allergies:  SULFA DRUGS, PENICILLIN, AND CODEINE.



Review of Systems:

A 10-point review was performed.



Physical Examination:

General:  This is a 57-year-old female, lying in bed, not in any acute cardiopulmonary distress. Anne Marie
l Signs:  Temperature 98.5, pulse 80, respirations 16, blood pressure 180/72. 

HEENT:  Unremarkable. 

Neck:  Supple. 

Lungs: Basal crackles. 

Heart:  S1, S2, regular. 

Abdomen:  Soft, nontender.  Bowel sounds present. Extremities: Multiple lesions and maculopapular sravanthi
h noted all across the extremities, chest, back, and abdomen.  Also dryness and erythematous changes 
noted to the face with dry scaly hyperpigmentation.



Laboratory Data:  Shows WBC 25,700, hemoglobin 11.6, platelets 239, normal eosinophils. Neutrophil is
 91%.  Blood cultures are negative.  X-ray of the foot shows negative for any acute finding.  A renal
 ultrasound done on 13th shows no acute finding.



Assessment And Plan:  A 57-year-old female with severe reaction to possible sulfa drugs, causing her 
to have extensive rash all across the body with possible vasculitis, cellulitis of the foot has impro
chaz, leukocytosis most likely secondary to steroid therapy, morbid obesity, anemia of chronic disease
, elevated procalcitonin. 



Continue current antibiotic treatment and steroid therapy.  Drug reaction can last for few weeks to m
onths, especially sulfa drugs.  We will continue to monitor patient clinical condition.  Monitor for 
signs of infection and fevers with WBC and fever trends. 



Thank you Dr. Ledesma for consult.





NF/MODL

DD:  08/15/2022 13:44:06Voice ID:  [id number]

DT:  08/15/2022 19:36:44Report ID:  [id number]

## 2022-08-16 LAB
ALBUMIN SERPL BCP-MCNC: 2.2 G/DL (ref 3.4–5)
ALP SERPL-CCNC: 76 U/L (ref 45–117)
ALT SERPL W P-5'-P-CCNC: 50 U/L (ref 12–78)
AST SERPL W P-5'-P-CCNC: 24 U/L (ref 15–37)
BUN BLD-MCNC: 31 MG/DL (ref 7–18)
GLUCOSE SERPLBLD-MCNC: 152 MG/DL (ref 74–106)
HCT VFR BLD CALC: 33.3 % (ref 36–45)
LYMPHOCYTES # SPEC AUTO: 1.1 K/UL (ref 0.7–4.9)
MAGNESIUM SERPL-MCNC: 2 MG/DL (ref 1.8–2.4)
MCV RBC: 88.7 FL (ref 80–100)
PMV BLD: 7.9 FL (ref 7.6–11.3)
POTASSIUM SERPL-SCNC: 5 MMOL/L (ref 3.5–5.1)
RBC # BLD: 3.75 M/UL (ref 3.86–4.86)

## 2022-08-16 RX ADMIN — SODIUM CHLORIDE SCH MLS: 9 INJECTION, SOLUTION INTRAVENOUS at 22:43

## 2022-08-16 RX ADMIN — ASPIRIN SCH MG: 81 TABLET, COATED ORAL at 10:03

## 2022-08-16 RX ADMIN — HEPARIN SODIUM SCH UNIT: 5000 INJECTION, SOLUTION INTRAVENOUS; SUBCUTANEOUS at 10:03

## 2022-08-16 RX ADMIN — Medication SCH ML: at 22:52

## 2022-08-16 RX ADMIN — FAMOTIDINE SCH MG: 20 TABLET, FILM COATED ORAL at 10:03

## 2022-08-16 RX ADMIN — Medication SCH: at 09:00

## 2022-08-16 RX ADMIN — SODIUM CHLORIDE SCH MLS: 9 INJECTION, SOLUTION INTRAVENOUS at 10:06

## 2022-08-16 RX ADMIN — ACETAMINOPHEN PRN MG: 500 TABLET, FILM COATED ORAL at 16:06

## 2022-08-16 RX ADMIN — ACETAMINOPHEN PRN MG: 500 TABLET, FILM COATED ORAL at 06:27

## 2022-08-16 RX ADMIN — SODIUM CHLORIDE SCH: 0.9 INJECTION, SOLUTION INTRAVENOUS at 00:20

## 2022-08-16 RX ADMIN — SODIUM CHLORIDE SCH MLS: 9 INJECTION, SOLUTION INTRAVENOUS at 00:05

## 2022-08-16 RX ADMIN — ACETAMINOPHEN PRN MG: 500 TABLET, FILM COATED ORAL at 22:44

## 2022-08-16 RX ADMIN — HEPARIN SODIUM SCH UNIT: 5000 INJECTION, SOLUTION INTRAVENOUS; SUBCUTANEOUS at 22:45

## 2022-08-16 RX ADMIN — SODIUM CHLORIDE SCH MLS: 0.9 INJECTION, SOLUTION INTRAVENOUS at 05:40

## 2022-08-16 RX ADMIN — FAMOTIDINE SCH MG: 20 TABLET, FILM COATED ORAL at 22:45

## 2022-08-16 NOTE — PN
Subjective:  The patient sitting in bed.  Denies any headache, nausea, vomiting, chest pain.  Rash is
 getting better.



Objective:  Vital Signs:  Reviewed. 

Lungs:  Basal crackles. 

Heart:  S1, S2.  Regular. 

Abdomen:  Soft, nontender.  Bowel sounds present. 

Extremity:  1+ edema.



Laboratory Data:  Shows WBC 25,000, hemoglobin 11.2, platelets 252.



Assessment And Plan:  Cellulitis of lower extremity, drug reaction most likely secondary to Bactrim a
nd sulfa drugs.  We will recommend to continue vancomycin at this time, stop cefepime.  We will follo
w the patient closely.  No other recommendation at this time.





NF/MODL

DD:  08/16/2022 12:45:53Voice ID:  [id number]

DT:  08/16/2022 14:49:03Report ID:  [id number]

## 2022-08-16 NOTE — RAD REPORT
EXAM DESCRIPTION:  MRI - Foot Left Wo Cont - 8/16/2022 3:35 pm

 

CLINICAL HISTORY:  Foot pain and swelling

 

COMPARISON:  X-ray August 12, 2022

 

TECHNIQUE:  Axial, sagittal and coronal magnetic resonance imaging left foot

 

FINDINGS:  Edema is present within subcutaneous tissues.

 

No significant abnormal signal within the bones.

 

No soft tissue abscess.

 

IMPRESSION:  No evidence of osteomyelitis

## 2022-08-16 NOTE — RAD REPORT
EXAM DESCRIPTION:  MRIFoot Right Wo Cont8/16/2022 3:44 pm

 

CLINICAL HISTORY:  Right foot pain

 

COMPARISON:  July 2022 x-ray

 

TECHNIQUE:  Axial, sagittal and coronal magnetic resonance imaging of the right foot was obtained.

 

FINDINGS:  Edema is present within the subcutaneous tissues.

 

No significant abnormal signal within the bones.

 

No soft tissue abscess is noted

 

No fracture or dislocation

 

IMPRESSION:  No evidence of osteomyelitis

## 2022-08-16 NOTE — PN
Date of Progress Note:  08/16/2022



Subjective:  Seen by bedside.  No chest pain.  No shortness of breath.  Skin rash is improving.



Review of Systems:

No chest pain, shortness of breath, orthopnea, cough, nausea, vomiting, diarrhea.  Skin rash is impro
ving.  All other systems reviewed and they were negative.



Physical Examination:

Vital Signs:  Reviewed. 

Head and Neck:  Pupils are equal, reactive to light.  Intact eye movements.  No JVD.  No cervical lym
phadenopathy.  Neck is supple.  Thyroid is not enlarged. 

Lungs:  Clear to auscultation bilaterally.  No rhonchi, wheezing, or crackles.  No accessory muscle u
se. 

Heart:  Regular rate and rhythm.  No extra sounds. 

Abdomen:  Soft, nontender.  Bowel sounds positive.  No organomegaly.  No masses or hernia.  No rigidi
ty or rebound. 

Extremities:  No clubbing or cyanosis.  Intact pulses. 

Skin:  No rash. 

Neurologic:  Alert, awake.  No acute focal deficits appreciated.



Investigations:  Labs were reviewed.



Assessment And Recommendations:  Elevated troponin.  As mentioned before, the patient is asymptomatic
.  Plan for outpatient stress test and an echocardiogram.





/TOMAS

DD:  08/16/2022 13:44:40Voice ID:  [id number]

DT:  08/16/2022 15:37:31Report ID:  [id number]

## 2022-08-16 NOTE — P.PN
Subjective


Date of Service: 08/16/22


Chief Complaint: Severe sepsis, ARF





Subjective 


Pt admitted for fever , sepsis and NICK 








today 


Cr down to 1.0 


will stop IVF 


can switch diet to regular with low potassium 





Physical exam


General: AAOx3, NAD, obese


HEENT PERRLA, moist mucose membrane 


neck: supple, no elevated JVD


CHEST; CTAB, no wheezes or rales


HEART : RRR. Normal S1,2 no murmur or rub 


Abd: soft, Nt


Ext: tarce edema 


Skin : upper extremity erythema , face skin exfolication 











# NICK 2/2 prerenal state/septic ATN + urinary retention


resolved 


Baseline SCr 1.1 as of 8/8/2022


SCr on admission is 2.4, improved to 1.0 


BNP elevated


Bladder scan on 8/13 +urinary retention


Cont Ortiz 


will dc IVF 





# Hyperkalemia


resolved 


low potassium diet 


 ortiz as above








# Hyponatremia


resolved 


Mild, monitor





# Severe sepsis 2/2 DM foot wound


Abx per primary team





# Diffuse rash likely 2/2 drug reaction


Rash also seen in palms & soles


Bactrim d/c'ed previously


F/U serology w.u 





# Htn


Cont current med regimen





# Hx of GBS


Per other services








Total time spent 45 minutes including documentation, reviewing labs , placing 

orders and discussing with medical team 





Physical Examination





- Vital Signs


Temperature: 98.5 F


Blood Pressure: 144/76


Pulse: 85


Respirations: 16


Pulse Ox (%): 100





- Studies


Microbiology Data (last 24 hrs): 








08/13/22 00:10   Clean Catch Urine   [place] Count - Final


                            No growth.


08/13/22 00:10   Clean Catch Urine    - Final


                            No growth.

## 2022-08-16 NOTE — EKG
Test Date:    2022-08-12               Test Time:    22:19:45

Technician:   PAM                                    

                                                     

MEASUREMENT RESULTS:                                       

Intervals:                                           

Rate:         103                                    

VA:           144                                    

QRSD:         82                                     

QT:           306                                    

QTc:          400                                    

Axis:                                                

P:            70                                     

VA:           144                                    

QRS:          79                                     

T:            60                                     

                                                     

INTERPRETIVE STATEMENTS:                                       

                                                     

Sinus tachycardia

Possible Anterior infarct, age undetermined

Abnormal ECG

Compared to ECG 08/10/1998 17:39:00

Myocardial infarct finding now present

Sinus rhythm no longer present



Electronically Signed On 08-16-22 08:12:52 CDT by Tim Aparicio

## 2022-08-16 NOTE — P.PN
Subjective


Date of Service: 08/16/22


Chief Complaint: Severe sepsis, ARF


No acute events overnight. She states that her rash is peeling and more pruritic

this morning. She states that she would like to switch her diet and have her 

Wood catheter removed. 





Review of Systems


10-point ROS is otherwise unremarkable


Integumentary: Rash (diffuse, pruritic)





Physical Examination





- Vital Signs


Temperature: 98.3 F


Blood Pressure: 186/86


Pulse: 97


Respirations: 18


Pulse Ox (%): 95





Assessment And Plan





- Plan


- Physical Exam


General: Alert, In no apparent distress, Oriented x3


HEENT: Atraumatic, PERRLA, Mucous membr. moist/pink, EOMI, Sclerae nonicteric


Neck: Supple, JVD not distended


Respiratory: Clear to auscultation bilaterally, Normal air movement


Cardiovascular: No edema, Regular rate/rhythm, Normal S1 S2, No gallops, No 

rubs, No murmurs


Gastrointestinal: Normal bowel sounds, Soft and benign, Non-distended, No 

tenderness, No rebound, No guarding


Musculoskeletal: No clubbing


Integumentary: Rash(es) (diffuse morbiliform rash with scattered macules and 

papules)


Neurological: Normal speech, Cranial nerves 3-12 intact, Abnormal speech








# Severe Sepsis likely secondary to Left Lower Extremity Cellulitis/Diabetic 

Foot Wound


# Acute Kidney Injury suspect due to Sepsis, resoved


She met SIRS criteria based on temperature > 100.9 F, WBC > 12,000 and the 

suspected source is cellulitis +/- diabetic foot ulcers.  


- Sepsis order set was initiated 


- Initial Lactate was 1.8


- Blood cultures drawn 


- Broad spectrum antibiotics started: Vancomycin + Cefepime


- In regards to fluids: 


   - 30 mL/kg of IV fluids was not administered given SBP > 90, MAP > 65, lactic

acid < 4  


- Ordered MRI left foot = "no evidence of osteomyelitis"





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 

Syndrome)


Her history of fever, leukocytosis, and a papular rash in combination with her 

neutrophilia is concerning for acute febrile neutrophilic dermatosis. Other 

differential diagnoses include, but are not limited to, Bactrim-induced vs 

vasculitis.


- Continue dexamethasone, diphenhydramine, famotidine


- Spoke with Vince at [facility] transfer center to discuss possible transfer with 

[facility]


   - They recommended transfer to a facility with inpatient Dermatology


   - She has been accepted to [facility], and is waiting availability of a

Med/Surg bed





# Suspect Type II NSTEMI (Demand Ischemia)


# Hypertension 


# Hyperlipidemia


- Cardiology consulted and spoke with Dr. Taveras - recommendations appreciated


- Serial troponin = 235.9 -> 280.6 -> 933.3 -> 579.5 -> 460.1


- Transthoracic echocardiogram = pending





Saji Li M.D.


Discharge Plan: Transfer (Accepted to [facility], awaiting bed 

availability)


Plan to discharge in: Unknown

## 2022-08-16 NOTE — PN
Date of Progress Note:  08/15/2022



Chief Complaint:  Acute kidney injury, severe sepsis.



Subjective:  The patient has nonoliguric urine output.  She developed acute 
kidney injury secondary to prerenal state septic ATN and urinary retention.  
Baseline creatinine on previous occasion back in August 2022 was 1.1.  During 
this admission, creatinine was elevated up to 2.4 and is gradually improving, 
although azotemia remains elevated.



Review of Systems:

Denies fever or chills.



Objective:  Lungs:  Clear to auscultation bilaterally. 

Heart:  S1 and S2. 

Abdomen:  Soft, benign. 

Extremities:  Minimal edema.



Impression And Plan:  

1.   Acute kidney injury secondary to prerenal azotemia and nonoliguric acute 
tubular necrosis complicated by urinary retention.  Urinalysis showed 
proteinuria, although hematuria was not present, there is no evidence of active 
urinary sediment except proteinuria.

a.   The patient will have a workup for proteinuria.  Bladder scan on August 13th showed urinary retention.  Continue Wood catheter.  Continue hydration.

2.   Hyperkalemia.  Continue hydration with IV fluids and by mouth as tolerated.

3.   Severe sepsis secondary to diabetic foot infection.  Continue antibiotics. 
Wound care.

4.   Hypertension.  Continue current medication regimen.  Avoid ACE 

inhibitor and angiotensin receptor blocker due to acute kidney injury.





EB/MODL

DD:  08/15/2022 22:34:45   Voice ID:  [id number]

DT:  08/16/2022 02:57:47   Report ID:  [id number]

NATALYA

## 2022-08-17 LAB
ALBUMIN SERPL BCP-MCNC: 2.4 G/DL (ref 3.4–5)
ALP SERPL-CCNC: 110 U/L (ref 45–117)
ALT SERPL W P-5'-P-CCNC: 134 U/L (ref 12–78)
AST SERPL W P-5'-P-CCNC: 68 U/L (ref 15–37)
BUN BLD-MCNC: 27 MG/DL (ref 7–18)
GLUCOSE SERPLBLD-MCNC: 130 MG/DL (ref 74–106)
HCT VFR BLD CALC: 35 % (ref 36–45)
LYMPHOCYTES # SPEC AUTO: 2.7 K/UL (ref 0.7–4.9)
MCV RBC: 89.9 FL (ref 80–100)
MORPHOLOGY BLD-IMP: (no result)
PMV BLD: 8 FL (ref 7.6–11.3)
POTASSIUM SERPL-SCNC: 5.2 MMOL/L (ref 3.5–5.1)
RBC # BLD: 3.89 M/UL (ref 3.86–4.86)
TOXIC GRANULES BLD QL SMEAR: PRESENT

## 2022-08-17 PROCEDURE — 0HBJXZX EXCISION OF LEFT UPPER LEG SKIN, EXTERNAL APPROACH, DIAGNOSTIC: ICD-10-PCS

## 2022-08-17 RX ADMIN — HYDRALAZINE HYDROCHLORIDE PRN MG: 20 INJECTION INTRAMUSCULAR; INTRAVENOUS at 09:21

## 2022-08-17 RX ADMIN — HEPARIN SODIUM SCH UNIT: 5000 INJECTION, SOLUTION INTRAVENOUS; SUBCUTANEOUS at 23:16

## 2022-08-17 RX ADMIN — DIPHENHYDRAMINE HYDROCHLORIDE PRN MG: 50 INJECTION, SOLUTION INTRAMUSCULAR; INTRAVENOUS at 14:01

## 2022-08-17 RX ADMIN — HYDRALAZINE HYDROCHLORIDE SCH MG: 25 TABLET, FILM COATED ORAL at 15:21

## 2022-08-17 RX ADMIN — ASPIRIN SCH MG: 81 TABLET, COATED ORAL at 09:21

## 2022-08-17 RX ADMIN — FAMOTIDINE SCH MG: 20 TABLET, FILM COATED ORAL at 09:21

## 2022-08-17 RX ADMIN — HYDRALAZINE HYDROCHLORIDE SCH MG: 25 TABLET, FILM COATED ORAL at 23:16

## 2022-08-17 RX ADMIN — SODIUM CHLORIDE SCH: 9 INJECTION, SOLUTION INTRAVENOUS at 09:00

## 2022-08-17 RX ADMIN — FAMOTIDINE SCH MG: 20 TABLET, FILM COATED ORAL at 23:16

## 2022-08-17 RX ADMIN — Medication SCH ML: at 09:00

## 2022-08-17 RX ADMIN — Medication SCH ML: at 23:16

## 2022-08-17 RX ADMIN — HEPARIN SODIUM SCH UNIT: 5000 INJECTION, SOLUTION INTRAVENOUS; SUBCUTANEOUS at 09:24

## 2022-08-17 NOTE — PN
Date of Progress Note:  08/17/2022



Subjective:  The patient was admitted with acute kidney injury.  Her acute 
kidney injury is secondary to prerenal/AIN secondary to the Bactrim.  
Obstructive uropathy has been ruled out.



Physical Examination:

Vital Signs:  When I saw the patient; blood pressure 131/105, pulse of 93, 
afebrile.  The patient had good urine output of 2800, positive of 1300. 

Chest:  Decreased entry bilateral base. 

Heart:  S1, S2.  Regular. 

Abdomen:  Morbidly obese. 

Extremity:  Plus edema. 

Neuro:  Alert.  No focality. 

Skin:  Has rash on the upper side of the body.



Laboratory Data:  Sodium 139, potassium 5.2, bicarb 23, BUN 27, creatinine 0.9, 
GFR of 67, calcium 8.8.  Albumin 2.4, corrected calcium is 10, PC ratio 0.7.  
Renal ultrasound, 10/9 increased echogenicity.



Current Medications:  The patient on include diphenhydramine, aspirin, 
hydralazine p.r.n., dexamethasone.



Assessment And Plan:  

1.   Acute kidney injury, multifactorial secondary to Bactrim/AIN/poor perfusion
ATN/ATN secondary to sepsis, recovered, resolved, nonoliguric, looked to me the 
patient currently on the over volume side.  I am going to go ahead and get chest
x-ray for the patient.  We will give the patient a single dose of Lasix and we 
will stop any IV fluid and we will monitor the patient.

2.   Hypertension, not controlled.  I will start the patient on carvedilol and 
hydralazine and we will follow up.

3.   Foot infection.  Follow up with primary.

4.   Allergic reaction to Bactrim.  The patient on dexamethasone.  We will 
follow up.



Time spent examining the patient face-to-face, reviewing the data lab and 
radiology, placing orders, discussing with the patient, explaining risks, 
benefits, alternatives, discussing with the staff member including nursing 
discussing with the hospitalist more than 35 minutes.

DYLAN

DD:  08/17/2022 12:24:22   Voice ID:  [id number]

DT:  08/17/2022 16:27:46   Report ID:  [id number]

NATALYA

## 2022-08-17 NOTE — ECHO
HEIGHT: 5 ft 6 in   WEIGHT: 285 lb 0 oz   DATE OF STUDY: 8/17/22   REFER DR: Saji Li MD

2-DIMENSIONAL: YES

     M.MODE: YES

 DOPPLER: NO

COLOR FLOW: NO



                    TDS:  NO

PORTABLE: YES

 DEFINITY:  NO

BUBBLE STUDY: NO





DIAGNOSIS:  ELEVATED TROPONIN



CARDIAC HISTORY:  

CATHERIZATION: NO

SURGERY: NO

PROSTHETIC VALVE: NO

PACEMAKER: NO





MEASUREMENTS (cm)

    DIASTOLIC (NORMALS)                 SYSTOLIC (NORMALS)

IVSd                 1.1 (0.6-1.2)                    LA Diam 3.0 (1.9-4.0)     LVEF       
  68%  

LVIDd               4.2 (3.5-5.7)                        LVIDs      2.7 (2.0-3.5)     %FS  
        37%

LVPWd             1.3 (0.6-1.2)

Ao Diam           2.5 (2.0-3.7)



2 DIMENSIONAL ASSESSMENT:

RIGHT ATRIUM:                   NORMAL

LEFT ATRIUM:       NORMAL



RIGHT VENTRICLE:            NORMAL

LEFT VENTRICLE: NORMAL



TRICUSPID VALVE:             NORMAL

MITRAL VALVE:     NORMAL



PULMONIC VALVE:             NORMAL

AORTIC VALVE:     NORMAL



PERICARDIAL EFFUSION: NONE

AORTIC ROOT:      NORMAL





LEFT VENTRICULAR WALL MOTION:     NORMAL.



DOPPLER/COLOR FLOW:     NORMAL.



COMMENTS:      NORMAL 2 ECHO WITH DOPPLER. NO WALL MOTION ABNORMALITY. NO EFFUSION.



TECHNOLOGIST:   FAISAL SONG

## 2022-08-17 NOTE — P.PN
Subjective


Date of Service: 08/17/22


Chief Complaint: Severe sepsis, ARF


No acute events overnight. Her rash appears to be improving with less erythema. 

She stated that she would prefer not to be transferred. Spoke with Dr. No, 

will plan to obtain skin punch biopsy here. He will verify with our pathologist 

to see if we can run diagnostic testing at our facility.





Review of Systems


10-point ROS is otherwise unremarkable


Integumentary: Rash (diffuse rash)





Physical Examination





- Vital Signs


Temperature: 98.9 F


Blood Pressure: 167/78


Pulse: 93


Respirations: 18


Pulse Ox (%): 94





Assessment And Plan





- Plan


- Physical Exam


General: Alert, In no apparent distress, Oriented x3


HEENT: Atraumatic, PERRLA, Mucous membr. moist/pink, EOMI, Sclerae nonicteric


Neck: Supple, JVD not distended


Respiratory: Clear to auscultation bilaterally, Normal air movement


Cardiovascular: No edema, Regular rate/rhythm, Normal S1 S2, No gallops, No 

rubs, No murmurs


Gastrointestinal: Normal bowel sounds, Soft and benign, Non-distended, No 

tenderness, No rebound, No guarding


Musculoskeletal: No clubbing


Integumentary: Rash(es) (diffuse morbiliform rash with scattered macules and 

papules, seems to be improving)


Neurological: Normal speech, Cranial nerves 3-12 intact, Abnormal speech








# Severe Sepsis likely secondary to Left Lower Extremity Cellulitis/Diabetic 

Foot Wound


# Acute Kidney Injury suspect due to Sepsis, resoved


She met SIRS criteria based on temperature > 100.9 F, WBC > 12,000 and the 

suspected source is cellulitis +/- diabetic foot ulcers.  


- Sepsis order set was initiated 


- Initial Lactate was 1.8


- Blood cultures drawn 


- Broad spectrum antibiotics started: Vancomycin + Cefepime


- In regards to fluids: 


   - 30 mL/kg of IV fluids was not administered given SBP > 90, MAP > 65, lactic

acid < 4  


- Ordered MRI left foot = "no evidence of osteomyelitis"





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 

Syndrome) vs Bactrim-Induced Drug Rash


Her history of fever, leukocytosis, and a papular rash in combination with her 

neutrophilia is concerning for acute febrile neutrophilic dermatosis. Other 

differential diagnoses include, but are not limited to, Bactrim-induced vs 

vasculitis.


- Continue dexamethasone, diphenhydramine, famotidine


- Transfer cancelled per Ms. Amaro' request


- Dr. No will look into obtaining skin punch biopsy at our facility if 

pathology is able to interpret without Dermatology





# Suspect Type II NSTEMI (Demand Ischemia)


# Hypertension 


# Hyperlipidemia


- Cardiology consulted and spoke with Dr. Taveras - recommendations appreciated


- Serial troponin = 235.9 -> 280.6 -> 933.3 -> 579.5 -> 460.1


- Transthoracic echocardiogram = pending





# Deconditioning


- Consulted PT - recs appreciated





Saji Li M.D.

## 2022-08-17 NOTE — PN
Subjective:  The patient is lying in bed, feeling slightly tired and not feeling as good as yesterday
.



Objective:  Vital Signs:  Temperature 97.9, pulse 93, respirations 18, blood pressure 230/105. 

Lungs:  Basal crackles. 

Heart:  S1, S2.  Regular. 

Abdomen:  Soft, nontender.  Bowel sounds present. 

Extremity:  Trace edema.  Erythematous changes have decreased, except left arm where a blood pressure
 cuff has been applied.



Laboratory Data:  Shows WBC 26,000, hemoglobin 9.8, platelets 297.  Chemistry shows sodium 139, potas
sium 5.2, chloride 111, bicarb 23, BUN 27, creatinine 0.9, glucose 130.  Albumin level is 2.4.



Assessment And Plan:  The patient with WBC of 26,000 most likely secondary to steroid therapy, cellul
itis of lower extremity.  Recommend to stop cefepime, continue vancomycin.  Rash most likely secondar
y to sulfa drugs, currently on high-dose steroids.  Hypertension, leave management to the medical hos
pitalist team.  Continue supportive care and antibiotic with vancomycin.  We will follow the patient 
closely.





NF/MODL

DD:  08/17/2022 12:08:30Voice ID:  [id number]

DT:  08/17/2022 12:39:39Report ID:  [id number]

## 2022-08-17 NOTE — RAD REPORT
EXAM DESCRIPTION:  RAD - Chest Single View - 8/17/2022 2:23 pm

 

CLINICAL HISTORY:  COPD

 

COMPARISON:  None

 

TECHNIQUE:  AP portable chest image was obtained 8/17/2022 2:23 pm .

 

FINDINGS:  No focal mass or consolidation. Interstitial pattern is accentuated by body habitus and un
justin penetrated portable technique. A mild interstitial edema or infiltrate could be masked.

 

 Heart size is upper normal. Central vasculature is mildly prominent. No measurable pleural effusion 
and no pneumothorax. No acute bony abnormality seen. No acute aortic findings suspected.

 

IMPRESSION:  Heart, vasculature lung markings are all accentuated by under penetrated portable techni
que.

 

Mild interstitial edema or interstitial infiltrate could be masked.

## 2022-08-18 LAB
BUN BLD-MCNC: 28 MG/DL (ref 7–18)
GLUCOSE SERPLBLD-MCNC: 171 MG/DL (ref 74–106)
HCT VFR BLD CALC: 35.3 % (ref 36–45)
LYMPHOCYTES # SPEC AUTO: 2.2 K/UL (ref 0.7–4.9)
MCV RBC: 88.6 FL (ref 80–100)
MORPHOLOGY BLD-IMP: (no result)
PMV BLD: 8.2 FL (ref 7.6–11.3)
POTASSIUM SERPL-SCNC: 4.5 MMOL/L (ref 3.5–5.1)
RBC # BLD: 3.98 M/UL (ref 3.86–4.86)

## 2022-08-18 RX ADMIN — FAMOTIDINE SCH MG: 20 TABLET, FILM COATED ORAL at 20:32

## 2022-08-18 RX ADMIN — FLUOCINONIDE PRN APPL: 0.5 CREAM TOPICAL at 13:00

## 2022-08-18 RX ADMIN — HEPARIN SODIUM SCH UNIT: 5000 INJECTION, SOLUTION INTRAVENOUS; SUBCUTANEOUS at 20:32

## 2022-08-18 RX ADMIN — DIPHENHYDRAMINE HYDROCHLORIDE PRN MG: 50 INJECTION, SOLUTION INTRAMUSCULAR; INTRAVENOUS at 20:30

## 2022-08-18 RX ADMIN — HEPARIN SODIUM SCH UNIT: 5000 INJECTION, SOLUTION INTRAVENOUS; SUBCUTANEOUS at 09:46

## 2022-08-18 RX ADMIN — Medication SCH ML: at 09:47

## 2022-08-18 RX ADMIN — DIPHENHYDRAMINE HYDROCHLORIDE PRN MG: 50 INJECTION, SOLUTION INTRAMUSCULAR; INTRAVENOUS at 13:05

## 2022-08-18 RX ADMIN — ASPIRIN SCH MG: 81 TABLET, COATED ORAL at 09:46

## 2022-08-18 RX ADMIN — Medication SCH ML: at 20:32

## 2022-08-18 RX ADMIN — AMLODIPINE BESYLATE SCH MG: 5 TABLET ORAL at 16:02

## 2022-08-18 RX ADMIN — HYDRALAZINE HYDROCHLORIDE SCH MG: 25 TABLET, FILM COATED ORAL at 13:06

## 2022-08-18 RX ADMIN — ACETAMINOPHEN PRN MG: 500 TABLET, FILM COATED ORAL at 20:30

## 2022-08-18 RX ADMIN — HYDRALAZINE HYDROCHLORIDE SCH MG: 25 TABLET, FILM COATED ORAL at 20:32

## 2022-08-18 RX ADMIN — HYDRALAZINE HYDROCHLORIDE SCH MG: 25 TABLET, FILM COATED ORAL at 09:46

## 2022-08-18 RX ADMIN — FAMOTIDINE SCH MG: 20 TABLET, FILM COATED ORAL at 09:46

## 2022-08-18 NOTE — PN
Subjective:  The patient is feeling better today, except her rash has worsened after she has Lasix.  
The patient denies any headache, nausea, vomiting, chest pain, abdominal pain, constipation, or diarr
hea.



Objective:  Vital Signs:  Temperature 97.8, pulse 85, respirations 16, blood pressure 178/86. 

Lungs:  Basal crackles. 

Heart S1, S2.  Regular. 

Abdomen:  Soft, nontender.  Bowel sounds present. 

Extremity:  Trace edema.



Laboratory Data:  Chest x-ray done yesterday shows the patient has vascular lungs marking attenuated,
 mild interstitial edema or interstitial infiltrate could be masked.  Lab data shows WBC 21,000 down 
from 26,000, hemoglobin 11.7, platelets 312.  Chemistry shows sodium 135, potassium 4.5, chloride 108
, bicarb 23, BUN 28, creatinine 0.9.



Assessment And Plan:  Drug rash from most likely from sulfa drugs.  The patient is off antibiotic sin
ce yesterday, feeling better except trash after Lasix.  The patient to continue to monitor for any si
gns of infection.  No active signs of infection noted except elevated WBC count which is most likely 
secondary to steroid therapy, should come down as steroids are be escalating.  We will follow the pat
ient closely for any signs of infection with WBC and fever trends.





NF/MODL

DD:  08/18/2022 13:37:27Voice ID:  [id number]

DT:  08/18/2022 14:58:44Report ID:  [id number]

## 2022-08-18 NOTE — PN
Date of Progress Note:  08/18/2022



Subjective:  The patient was admitted with acute kidney injury, multifactorial, 
secondary to prerenal, poor perfusion ATN/AIN secondary to Bactrim use.  
Yesterday, the patient received a dose of Lasix.  According to her, her rash got
worse, but her respiratory status has been improved.



Physical Examination:

Vital Signs:  When I saw the patient; blood pressure of 178/86, pulse of 85, 
afebrile.  The patient had good urine output of 1600 even balance. 

Chest:  Faint rales bilateral. 

Heart:  S1, S2.  Systolic murmur. 

Abdomen:  Soft, nontender.  Morbidly obese.  Could not appreciate any 
organomegaly. 

Extremities:  Trace edema. 

Neurological:  Alert, oriented x3.  No focal. 

Skin:  Has erythematous macular rash, more prominent on the upper extremity.



Laboratory Data:  WBC 21.6, H and H 11.7/35.3, platelets 312.  Eosinophil is 
100, it was elevated up to 800 before.  Urinalysis; PC ratio 0.7.



Current Medications:  The patient on include aspirin, diphenhydramine, 
carvedilol 6.25, hydralazine 25 t.i.d., Pepcid, prednisone.



Assessment And Plan:  

1.   Acute kidney injury, multifactorial, poor perfusion, ATN/toxic ATN/AIN 
secondary to Bactrim, recovered, resolved, still looked to me on the over volume
side.  I am going to go ahead and start the patient on ethacrynic acid as the 
patient cannot be exposed to any sulfa component given the Bactrim incident and 
furosemide yesterday.

2.   Hypertension, not controlled.  We just started hydralazine.  We will add 
ethacrynic and we will follow up.

3.   Foot infection.  Continue current antibiotic.  We will follow up with 
primary.

4.   Allergic reaction to the Bactrim.  The patient is on prednisone and we will
follow up.  Start the patient on ethacrynic acid.



Time spent examining the patient face-to-face, reviewing the data lab and 
radiology, placing orders, discussing with the patient, explaining risks, 
benefits, alternatives, discussing with the staff member including nursing 
discussing with the hospitalist more than 35 minutes.

MA/TOMAS

DD:  08/18/2022 12:47:40   Voice ID:  [id number]

DT:  08/18/2022 13:43:26   Report ID:  [id number]

MTDD

## 2022-08-18 NOTE — OP
Date of Procedure:  08/17/2022



Surgeon:  Long No MD



Indications:  The patient is a 57-year-old female who was initially consulted last Saturday for wound
 and cellulitis of the left lower extremity and a rash.  The patient was being treated with steroids 
and the rash is getting better and the antibiotics were helping the cellulitis.  However, there was n
o definitive diagnosis and concern was made that a skin biopsy would help elucidate the diagnosis and
 therefore help modify the treatment options for the patient.  I discussed the case with Dr. Guillermo wilson informed consent was obtained from the patient.  She understands the risks, benefits, and alternati
ves for a punch biopsy.  The patient agreed to the procedure.



Procedure In Detail:  The patient was prepped and draped in usual sterile fashion.  In the left upper
 thigh, lidocaine 1% infiltrated at the bed side.  Then 3 mm punch biopsy x2 was done, one was sent f
or cytology and the other for culture including fungus, AFB, and bacterial infection.  Pressure was a
pplied to control the bleeding and a sterile dressing was applied.  The patient tolerated the procedu
re in stable condition.





AS/MODL

DD:  08/17/2022 14:57:21Voice ID:  [id number]

DT:  08/18/2022 02:00:27Report ID:  [id number]

## 2022-08-18 NOTE — P.PN
Subjective


Date of Service: 08/18/22


Chief Complaint: Severe sepsis, ARF


No acute events overnight. She worked with PT yesterday, and had significant 

weakness. It was recommended that she go to inpatient rehab for deconditioning. 

She is in agreement with this plan.





Review of Systems


10-point ROS is otherwise unremarkable


General: Weakness


Integumentary: Rash (generalized)





Physical Examination





- Vital Signs


Temperature: 98.7 F


Blood Pressure: 137/73


Pulse: 73


Respirations: 16


Pulse Ox (%): 95





- Studies


Microbiology Data (last 24 hrs): 








08/12/22 22:37   Blood  - Blood   Aerobic Blood Culture - Final


                            No growth in 5 days.


08/12/22 22:37   Blood  - Blood   Anaerobic Blood Culture - Final


                            No growth in 5 days.








Assessment And Plan





- Plan


- Physical Exam


General: Alert, In no apparent distress, Oriented x3


HEENT: Atraumatic, PERRLA, Mucous membr. moist/pink, EOMI, Sclerae nonicteric


Neck: Supple, JVD not distended


Respiratory: Clear to auscultation bilaterally, Normal air movement


Cardiovascular: No edema, Regular rate/rhythm, Normal S1 S2, No gallops, No 

rubs, No murmurs


Gastrointestinal: Normal bowel sounds, Soft and benign, Non-distended, No 

tenderness, No rebound, No guarding


Musculoskeletal: No clubbing


Integumentary: Rash(es) (diffuse morbiliform rash with scattered macules and 

papules, peeling - much improved from prior)


Neurological: Normal speech, Cranial nerves 3-12 intact, Abnormal speech








# Severe Sepsis likely secondary to Left Lower Extremity Cellulitis/Diabetic 

Foot Wound


# Acute Kidney Injury suspect due to Sepsis, resoved


She met SIRS criteria based on temperature > 100.9 F, WBC > 12,000 and the 

suspected source is cellulitis +/- diabetic foot ulcers.  


- Sepsis order set was initiated 


- Initial Lactate was 1.8


- Blood cultures drawn 


- Broad spectrum antibiotics started: Vancomycin + Cefepime


- In regards to fluids: 


   - 30 mL/kg of IV fluids was not administered given SBP > 90, MAP > 65, lactic

acid < 4  


- Ordered MRI left foot = "no evidence of osteomyelitis"





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 

Syndrome) vs Bactrim-Induced Drug Rash


Her history of fever, leukocytosis, and a papular rash in combination with her 

neutrophilia is concerning for acute febrile neutrophilic dermatosis. Other 

differential diagnoses include, but are not limited to, Bactrim-induced vs 

vasculitis.


- Continue dexamethasone, diphenhydramine, famotidine


- Transfer cancelled per Ms. Amaro' request


- Dr. No will look into obtaining skin punch biopsy at our facility if 

pathology is able to interpret without Dermatology





# Suspect Type II NSTEMI (Demand Ischemia)


# Hypertension 


# Hyperlipidemia


- Cardiology consulted and spoke with Dr. Taveras - recommendations appreciated


- Serial troponin = 235.9 -> 280.6 -> 933.3 -> 579.5 -> 460.1


- Transthoracic echocardiogram = pending





# Deconditioning


- Consulted PT - recs appreciated





Disposition is pending placement - appreciate Case Management assistance





Saji Li M.D.


Discharge Plan: Transfer (rehab)


Plan to discharge in: Unknown

## 2022-08-19 LAB
ALBUMIN SERPL BCP-MCNC: 2.2 G/DL (ref 3.4–5)
ALP SERPL-CCNC: 97 U/L (ref 45–117)
ALT SERPL W P-5'-P-CCNC: 79 U/L (ref 12–78)
AST SERPL W P-5'-P-CCNC: 20 U/L (ref 15–37)
BUN BLD-MCNC: 31 MG/DL (ref 7–18)
GLUCOSE SERPLBLD-MCNC: 178 MG/DL (ref 74–106)
HCT VFR BLD CALC: 34.8 % (ref 36–45)
LYMPHOCYTES # SPEC AUTO: 2.1 K/UL (ref 0.7–4.9)
MCV RBC: 88.4 FL (ref 80–100)
PMV BLD: 7.8 FL (ref 7.6–11.3)
POTASSIUM SERPL-SCNC: 4.7 MMOL/L (ref 3.5–5.1)
RBC # BLD: 3.94 M/UL (ref 3.86–4.86)

## 2022-08-19 RX ADMIN — Medication SCH ML: at 21:09

## 2022-08-19 RX ADMIN — DIPHENHYDRAMINE HYDROCHLORIDE PRN MG: 50 INJECTION, SOLUTION INTRAMUSCULAR; INTRAVENOUS at 07:17

## 2022-08-19 RX ADMIN — ONDANSETRON PRN MG: 2 INJECTION INTRAMUSCULAR; INTRAVENOUS at 18:29

## 2022-08-19 RX ADMIN — FAMOTIDINE SCH MG: 20 TABLET, FILM COATED ORAL at 09:00

## 2022-08-19 RX ADMIN — HYDRALAZINE HYDROCHLORIDE SCH MG: 25 TABLET, FILM COATED ORAL at 14:25

## 2022-08-19 RX ADMIN — HEPARIN SODIUM SCH UNIT: 5000 INJECTION, SOLUTION INTRAVENOUS; SUBCUTANEOUS at 09:00

## 2022-08-19 RX ADMIN — DIPHENHYDRAMINE HYDROCHLORIDE PRN MG: 50 INJECTION, SOLUTION INTRAMUSCULAR; INTRAVENOUS at 13:09

## 2022-08-19 RX ADMIN — ACETAMINOPHEN PRN MG: 500 TABLET, FILM COATED ORAL at 21:08

## 2022-08-19 RX ADMIN — HYDRALAZINE HYDROCHLORIDE PRN MG: 20 INJECTION INTRAMUSCULAR; INTRAVENOUS at 23:45

## 2022-08-19 RX ADMIN — HYDRALAZINE HYDROCHLORIDE SCH MG: 25 TABLET, FILM COATED ORAL at 21:08

## 2022-08-19 RX ADMIN — HEPARIN SODIUM SCH UNIT: 5000 INJECTION, SOLUTION INTRAVENOUS; SUBCUTANEOUS at 21:09

## 2022-08-19 RX ADMIN — FAMOTIDINE SCH MG: 20 TABLET, FILM COATED ORAL at 21:08

## 2022-08-19 RX ADMIN — ETHACRYNIC ACID SCH MG: 25 TABLET ORAL at 09:00

## 2022-08-19 RX ADMIN — AMLODIPINE BESYLATE SCH MG: 5 TABLET ORAL at 08:59

## 2022-08-19 RX ADMIN — DIPHENHYDRAMINE HYDROCHLORIDE PRN MG: 50 INJECTION, SOLUTION INTRAMUSCULAR; INTRAVENOUS at 21:10

## 2022-08-19 RX ADMIN — ONDANSETRON PRN MG: 2 INJECTION INTRAMUSCULAR; INTRAVENOUS at 23:38

## 2022-08-19 RX ADMIN — ACETAMINOPHEN PRN MG: 500 TABLET, FILM COATED ORAL at 09:01

## 2022-08-19 RX ADMIN — ASPIRIN SCH MG: 81 TABLET, COATED ORAL at 08:59

## 2022-08-19 RX ADMIN — HYDRALAZINE HYDROCHLORIDE SCH MG: 25 TABLET, FILM COATED ORAL at 08:59

## 2022-08-19 RX ADMIN — Medication SCH ML: at 09:02

## 2022-08-19 NOTE — P.PN
Subjective


Date of Service: 08/19/22


Chief Complaint: Severe sepsis, ARF


No acute events overnight. Her rash has improved significantly. She has no 

concerns this morning. She is eager for discharge once accepted to inpatient 

rehab.





Review of Systems


10-point ROS is otherwise unremarkable


Integumentary: Rash (diffuse)





Physical Examination





- Vital Signs


Temperature: 98.2 F


Blood Pressure: 163/72


Pulse: 71


Respirations: 12


Pulse Ox (%): 97





Assessment And Plan





- Plan


- Physical Exam


General: Alert, In no apparent distress, Oriented x3


HEENT: Atraumatic, PERRLA, Mucous membr. moist/pink, EOMI, Sclerae nonicteric


Neck: Supple, JVD not distended


Respiratory: Clear to auscultation bilaterally, Normal air movement


Cardiovascular: No edema, Regular rate/rhythm, Normal S1 S2, No gallops, No 

rubs, No murmurs


Gastrointestinal: Normal bowel sounds, Soft and benign, Non-distended, No 

tenderness, No rebound, No guarding


Musculoskeletal: No clubbing


Integumentary: Rash(es) (diffuse morbiliform rash with scattered macules and 

papules, peeling - much improved from prior)


Neurological: Normal speech, Cranial nerves 3-12 intact, Abnormal speech








# Severe Sepsis likely secondary to Left Lower Extremity Cellulitis/Diabetic 

Foot Wound


# Acute Kidney Injury suspect due to Sepsis, resoved


She met SIRS criteria based on temperature > 100.9 F, WBC > 12,000 and the 

suspected source is cellulitis +/- diabetic foot ulcers.  


- Sepsis order set was initiated 


- Initial Lactate was 1.8


- Blood cultures drawn 


- Broad spectrum antibiotics started: Vancomycin + Cefepime


- In regards to fluids: 


   - 30 mL/kg of IV fluids was not administered given SBP > 90, MAP > 65, lactic

acid < 4  


- Ordered MRI left foot = "no evidence of osteomyelitis"





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 

Syndrome) vs Bactrim-Induced Drug Rash


Her history of fever, leukocytosis, and a papular rash in combination with her 

neutrophilia is concerning for acute febrile neutrophilic dermatosis. Other 

differential diagnoses include, but are not limited to, Bactrim-induced vs 

vasculitis.


- Continue dexamethasone, diphenhydramine, famotidine


- Transfer cancelled per Ms. Amaro' request


- Dr. No will look into obtaining skin punch biopsy at our facility if 

pathology is able to interpret without Dermatology


- ASO titer <50


- Total compliment >60





# Suspect Type II NSTEMI (Demand Ischemia)


# Hypertension 


# Hyperlipidemia


- Cardiology consulted and spoke with Dr. Taveras - recommendations appreciated


- Serial troponin = 235.9 -> 280.6 -> 933.3 -> 579.5 -> 460.1


- Transthoracic echocardiogram = "normal 2 echo with doppler. no wall motion 

abnormality. no effusion."





# Deconditioning


- Consulted PT - recs appreciated





Disposition is pending placement - appreciate Case Management assistance





Saji Li M.D.


Discharge Plan: Transfer (rehab)


Plan to discharge in: Unknown

## 2022-08-19 NOTE — P.PN
Subjective


Date of Service: 08/19/22


Chief Complaint: Severe sepsis, ARF


Subjective: Other (Reports having less itching.)





Physical Examination





- Vital Signs


Temperature: 98.3 F


Blood Pressure: 142/67


Pulse: 69


Respirations: 14


Pulse Ox (%): 96





- Physical Exam


General: Alert, In no apparent distress


HEENT: Atraumatic, Normocephalic


Neck: Supple, JVD not distended


Respiratory: Other (Symmetric chest expansion)


Cardiovascular: No rubs, No murmurs


Gastrointestinal: Soft and benign, No rebound


Musculoskeletal: No clubbing


Integumentary: No warmth, Other (+diffuse rash, resolving)


Neurological: Normal speech, Normal tone


Urinary: Other (No bladder distention)


External genitalia: Deferred


Rectal: Deferred





Assessment And Plan





- Plan





# NICK 2/2 prerenal state/septic ATN + urinary retention


Resolved


Narvon po fluid intake


OOB as tolerated to lessen bladder hypomobility


Monitor for recurrent urinary retention


Monitor I/O, renal panel





# Severe sepsis 2/2 DM foot wound


Sepsis improved


Abx per primary team





# Diffuse rash likely 2/2 drug reaction


Rash also seen in palms & soles


Bactrim d/c'ed previously


S/p skin biopsy, f/u result


Receiving steroids





# Htn


Cont current med regimen





# Hx of GBS


PT/OT

## 2022-08-20 RX ADMIN — ASPIRIN SCH MG: 81 TABLET, COATED ORAL at 09:28

## 2022-08-20 RX ADMIN — ONDANSETRON PRN MG: 2 INJECTION INTRAMUSCULAR; INTRAVENOUS at 04:50

## 2022-08-20 RX ADMIN — ETHACRYNIC ACID SCH: 25 TABLET ORAL at 09:00

## 2022-08-20 RX ADMIN — MONTELUKAST SODIUM SCH MG: 10 TABLET, FILM COATED ORAL at 21:08

## 2022-08-20 RX ADMIN — AMLODIPINE BESYLATE SCH MG: 5 TABLET ORAL at 09:27

## 2022-08-20 RX ADMIN — HYDRALAZINE HYDROCHLORIDE SCH MG: 25 TABLET, FILM COATED ORAL at 13:08

## 2022-08-20 RX ADMIN — Medication SCH: at 09:00

## 2022-08-20 RX ADMIN — HEPARIN SODIUM SCH UNIT: 5000 INJECTION, SOLUTION INTRAVENOUS; SUBCUTANEOUS at 21:07

## 2022-08-20 RX ADMIN — HYDRALAZINE HYDROCHLORIDE SCH MG: 25 TABLET, FILM COATED ORAL at 21:08

## 2022-08-20 RX ADMIN — FAMOTIDINE SCH MG: 20 TABLET, FILM COATED ORAL at 09:28

## 2022-08-20 RX ADMIN — HYDRALAZINE HYDROCHLORIDE SCH MG: 25 TABLET, FILM COATED ORAL at 09:27

## 2022-08-20 RX ADMIN — Medication SCH ML: at 21:09

## 2022-08-20 RX ADMIN — DIPHENHYDRAMINE HYDROCHLORIDE PRN MG: 50 INJECTION, SOLUTION INTRAMUSCULAR; INTRAVENOUS at 21:09

## 2022-08-20 RX ADMIN — ACETAMINOPHEN PRN MG: 500 TABLET, FILM COATED ORAL at 21:08

## 2022-08-20 RX ADMIN — FAMOTIDINE SCH MG: 20 TABLET, FILM COATED ORAL at 21:08

## 2022-08-20 RX ADMIN — HEPARIN SODIUM SCH UNIT: 5000 INJECTION, SOLUTION INTRAVENOUS; SUBCUTANEOUS at 09:28

## 2022-08-20 NOTE — P.PN
Subjective


Date of Service: 08/20/22


Chief Complaint: Severe sepsis, ARF


Overnight, she states that she received a dose of morphine. Following 

administration of this medication, she developed an urticarial rash on her right

arm. She states that she believes that she is allergic to morphine, but cannot 

remember. She request that we add this to her medication allergy list. 

Otherwise, she reports no acute concerns this morning.





Review of Systems


Integumentary: Rash (diffuse)





Physical Examination





- Vital Signs


Temperature: 98.3 F


Blood Pressure: 141/70


Pulse: 75


Respirations: 14


Pulse Ox (%): 98





Assessment And Plan





- Plan


- Physical Exam


General: Alert, In no apparent distress, Oriented x3


HEENT: Atraumatic, PERRLA, Mucous membr. moist/pink, EOMI, Sclerae nonicteric


Neck: Supple, JVD not distended


Respiratory: Clear to auscultation bilaterally, Normal air movement


Cardiovascular: No edema, Regular rate/rhythm, Normal S1 S2, No gallops, No r

ubs, No murmurs


Gastrointestinal: Normal bowel sounds, Soft and benign, Non-distended, No 

tenderness, No rebound, No guarding


Musculoskeletal: No clubbing


Integumentary: Rash(es) (diffuse morbiliform rash with scattered macules and 

papules, peeling - much improved from prior) - new urticaria on medial right 

upper arm


Neurological: Normal speech, Cranial nerves 3-12 intact, Abnormal speech








# Severe Sepsis likely secondary to Left Lower Extremity Cellulitis/Diabetic 

Foot Wound


# Acute Kidney Injury suspect due to Sepsis, resoved


She met SIRS criteria based on temperature > 100.9 F, WBC > 12,000 and the 

suspected source is cellulitis +/- diabetic foot ulcers.  


- Sepsis order set was initiated 


- Initial Lactate was 1.8


- Blood cultures drawn 


- Broad spectrum antibiotics started: Vancomycin + Cefepime


- In regards to fluids: 


   - 30 mL/kg of IV fluids was not administered given SBP > 90, MAP > 65, lactic

acid < 4  


- Ordered MRI left foot = "no evidence of osteomyelitis"





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 

Syndrome) vs Bactrim-Induced Drug Rash


Her history of fever, leukocytosis, and a papular rash in combination with her 

neutrophilia is concerning for acute febrile neutrophilic dermatosis. Other 

differential diagnoses include, but are not limited to, Bactrim-induced vs 

vasculitis.


- Continue dexamethasone, diphenhydramine, famotidine


- Transfer cancelled per Ms. Amaro' request


- Dr. No will look into obtaining skin punch biopsy at our facility if 

pathology is able to interpret without Dermatology


- ASO titer <50


- Total compliment >60


- Started montelukast





# Suspect Type II NSTEMI (Demand Ischemia)


# Hypertension 


# Hyperlipidemia


- Cardiology consulted and spoke with Dr. Taveras - recommendations appreciated


- Serial troponin = 235.9 -> 280.6 -> 933.3 -> 579.5 -> 460.1


- Transthoracic echocardiogram = "normal 2 echo with doppler. no wall motion 

abnormality. no effusion."





# Deconditioning


- Consulted PT - recs appreciated





Disposition is pending placement - appreciate Case Management assistance





Saji Li M.D.


Discharge Plan: Transfer (rehab)


Plan to discharge in: Unknown

## 2022-08-21 LAB
ALBUMIN SERPL BCP-MCNC: 2.4 G/DL (ref 3.4–5)
ALP SERPL-CCNC: 79 U/L (ref 45–117)
ALT SERPL W P-5'-P-CCNC: 46 U/L (ref 12–78)
AST SERPL W P-5'-P-CCNC: 13 U/L (ref 15–37)
BUN BLD-MCNC: 30 MG/DL (ref 7–18)
GLUCOSE SERPLBLD-MCNC: 164 MG/DL (ref 74–106)
HCT VFR BLD CALC: 35.1 % (ref 36–45)
LYMPHOCYTES # SPEC AUTO: 2.3 K/UL (ref 0.7–4.9)
MCV RBC: 88.2 FL (ref 80–100)
MORPHOLOGY BLD-IMP: (no result)
PMV BLD: 7.6 FL (ref 7.6–11.3)
POLYCHROMASIA BLD QL SMEAR: SLIGHT
POTASSIUM SERPL-SCNC: 4.8 MMOL/L (ref 3.5–5.1)
RBC # BLD: 3.98 M/UL (ref 3.86–4.86)
SMUDGE CELLS BLD QL SMEAR: PRESENT
TOXIC GRANULES BLD QL SMEAR: PRESENT

## 2022-08-21 RX ADMIN — DIPHENHYDRAMINE HYDROCHLORIDE PRN MG: 50 INJECTION, SOLUTION INTRAMUSCULAR; INTRAVENOUS at 21:16

## 2022-08-21 RX ADMIN — Medication SCH ML: at 09:10

## 2022-08-21 RX ADMIN — HYDRALAZINE HYDROCHLORIDE SCH MG: 25 TABLET, FILM COATED ORAL at 21:04

## 2022-08-21 RX ADMIN — ETHACRYNIC ACID SCH: 25 TABLET ORAL at 09:00

## 2022-08-21 RX ADMIN — MONTELUKAST SODIUM SCH MG: 10 TABLET, FILM COATED ORAL at 21:03

## 2022-08-21 RX ADMIN — ASPIRIN SCH MG: 81 TABLET, COATED ORAL at 09:09

## 2022-08-21 RX ADMIN — AMLODIPINE BESYLATE SCH MG: 5 TABLET ORAL at 09:09

## 2022-08-21 RX ADMIN — HYDRALAZINE HYDROCHLORIDE SCH MG: 25 TABLET, FILM COATED ORAL at 13:49

## 2022-08-21 RX ADMIN — FAMOTIDINE SCH MG: 20 TABLET, FILM COATED ORAL at 09:09

## 2022-08-21 RX ADMIN — Medication SCH ML: at 22:19

## 2022-08-21 RX ADMIN — FAMOTIDINE SCH MG: 20 TABLET, FILM COATED ORAL at 21:04

## 2022-08-21 RX ADMIN — HYDRALAZINE HYDROCHLORIDE SCH MG: 25 TABLET, FILM COATED ORAL at 09:09

## 2022-08-21 RX ADMIN — LACTOBACILLUS TAB SCH TAB: TAB at 13:49

## 2022-08-21 RX ADMIN — HEPARIN SODIUM SCH UNIT: 5000 INJECTION, SOLUTION INTRAVENOUS; SUBCUTANEOUS at 09:09

## 2022-08-21 RX ADMIN — HEPARIN SODIUM SCH UNIT: 5000 INJECTION, SOLUTION INTRAVENOUS; SUBCUTANEOUS at 21:21

## 2022-08-21 NOTE — P.PN
Subjective


Date of Service: 08/21/22


Chief Complaint: Severe sepsis, ARF


No acute events overnight. She states that her rash is slightly pruritic this 

morning and requests a topical cream. Additionally, she requests a probiotic. 

Topical hydrocortisone and PO lactobacillus was started. No additional concerns 

this morning.





Review of Systems


10-point ROS is otherwise unremarkable


Integumentary: Rash (diffuse, pruritic)





Physical Examination





- Vital Signs


Temperature: 98.4 F


Blood Pressure: 161/72


Pulse: 67


Respirations: 14


Pulse Ox (%): 95





Assessment And Plan





- Plan


- Physical Exam


General: Alert, In no apparent distress, Oriented x3


HEENT: Atraumatic, PERRLA, Mucous membr. moist/pink, EOMI, Sclerae nonicteric


Neck: Supple, JVD not distended


Respiratory: Clear to auscultation bilaterally, Normal air movement


Cardiovascular: No edema, Regular rate/rhythm, Normal S1 S2, No gallops, No 

rubs, No murmurs


Gastrointestinal: Normal bowel sounds, Soft and benign, Non-distended, No 

tenderness, No rebound, No guarding


Musculoskeletal: No clubbing


Integumentary: Rash(es) (diffuse morbiliform rash with scattered macules and 

papules, peeling - significantly improved from prior) - improving urticaria on 

medial right upper arm


Neurological: Normal speech, Cranial nerves 3-12 intact, Abnormal speech








# Severe Sepsis likely secondary to Left Lower Extremity Cellulitis/Diabetic 

Foot Wound


# Acute Kidney Injury suspect due to Sepsis, resolved


She met SIRS criteria based on temperature > 100.9 F, WBC > 12,000 and the 

suspected source is cellulitis +/- diabetic foot ulcers.  


- Sepsis order set was initiated 


- Initial Lactate was 1.8


- Blood cultures drawn 


- Broad spectrum antibiotics started: Vancomycin + Cefepime


- In regards to fluids: 


   - 30 mL/kg of IV fluids was not administered given SBP > 90, MAP > 65, lactic

acid < 4  


- Ordered MRI left foot = "no evidence of osteomyelitis"





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 

Syndrome) vs Bactrim-Induced Drug Rash


Her history of fever, leukocytosis, and a papular rash in combination with her 

neutrophilia is concerning for acute febrile neutrophilic dermatosis. Other 

differential diagnoses include, but are not limited to, Bactrim-induced vs 

vasculitis.


- Continue dexamethasone, diphenhydramine, famotidine


- Transfer cancelled per Ms. Amaro' request


- Dr. No will look into obtaining skin punch biopsy at our facility if 

pathology is able to interpret without Dermatology


- ASO titer <50


- Total compliment >60


- Started montelukast, topical hydrocortisone





# Suspect Type II NSTEMI (Demand Ischemia)


# Hypertension 


# Hyperlipidemia


- Cardiology consulted and spoke with Dr. Taveras - recommendations appreciated


- Serial troponin = 235.9 -> 280.6 -> 933.3 -> 579.5 -> 460.1


- Transthoracic echocardiogram = "normal 2 echo with doppler. no wall motion 

abnormality. no effusion."





# Deconditioning


- Consulted PT - recs appreciated





Disposition is pending placement - appreciate Case Management assistance





Saji Li M.D.


Discharge Plan: Transfer (acute rehab)


Plan to discharge in: Unknown

## 2022-08-21 NOTE — PN
Date of Progress Note:  08/21/2022



Chief Complaint:  Severe sepsis, acute kidney injury.



Subjective:  The patient is feeling better.  She was medicated for nausea and vomiting.



Objective:  Abdomen:  Soft, benign.  

Extremities:  Slight edema. 

Lungs:  Clear to auscultation bilaterally.



Impression And Plan:  

1.Acute kidney injury secondary to prerenal state with septic acute tubular necrosis complicated by 
urinary retention.  Renal function has improved to baseline.  Acute kidney injury, resolved.  Continu
e adequate hydration.  Monitor intake and output.  Avoid nephrotoxic medication.  The patient cannot 
take nonsteroidal anti-inflammatory medication.  Monitor for any evidence of urinary retention.  The 
patient may need Wood catheter if there is very significant urinary retention.

2.Severe sepsis secondary to diabetic foot infection.  Sepsis has improved.  Continue wound care and
 antibiotics.

3.Hypertension.  Continue current treatment.





BALJINDER/MODL

DD:  08/21/2022 21:27:05Voice ID:  [id number]

DT:  08/21/2022 23:23:52Report ID:  [id number]

## 2022-08-21 NOTE — PN
Date of Progress Note:  08/20/2022



Chief Complaint:  Severe sepsis, acute kidney injury.



Review of Systems:

Patient denies fever, chills.



Physical Examination:

Lungs:  Clear to auscultation bilaterally. 

Heart:  S1, S2. 

Abdomen:  Soft, benign. 

Extremities:  Slight edema.



Impression And Plan:  

1.Acute kidney injury secondary to prerenal state with septic acute tubular necrosis complicated by 
urinary retention.  Renal function has improved to baseline.  Acute kidney injury, resolved.  Continu
e adequate hydration.  Avoid nephrotoxic medication.  Patient cannot take nonsteroidal anti-inflammat
ory medication.  Monitor I's and O's and renal panel.  Monitor for any evidence of recurrent urinary 
retention.

2.Severe sepsis secondary to diabetic foot infection.  Sepsis has improved.  Patient will continue a
ntibiotics as per primary team.  Adjust dose to renal function.

3.Hypertension.  Continue current medication.





BALJINDER/TOMAS

DD:  08/20/2022 21:42:56Voice ID:  [id number]

DT:  08/21/2022 00:37:24Report ID:  [id number]

## 2022-08-22 LAB
ALBUMIN SERPL BCP-MCNC: 2.5 G/DL (ref 3.4–5)
ALP SERPL-CCNC: 78 U/L (ref 45–117)
ALT SERPL W P-5'-P-CCNC: 58 U/L (ref 12–78)
AST SERPL W P-5'-P-CCNC: 30 U/L (ref 15–37)
BUN BLD-MCNC: 34 MG/DL (ref 7–18)
GLUCOSE SERPLBLD-MCNC: 167 MG/DL (ref 74–106)
HCT VFR BLD CALC: 33.8 % (ref 36–45)
LYMPHOCYTES # SPEC AUTO: 1.8 K/UL (ref 0.7–4.9)
MCV RBC: 87.5 FL (ref 80–100)
PMV BLD: 7.5 FL (ref 7.6–11.3)
POTASSIUM SERPL-SCNC: 5 MMOL/L (ref 3.5–5.1)
RBC # BLD: 3.86 M/UL (ref 3.86–4.86)

## 2022-08-22 RX ADMIN — Medication SCH ML: at 21:12

## 2022-08-22 RX ADMIN — HYDRALAZINE HYDROCHLORIDE SCH MG: 25 TABLET, FILM COATED ORAL at 13:32

## 2022-08-22 RX ADMIN — MONTELUKAST SODIUM SCH MG: 10 TABLET, FILM COATED ORAL at 21:07

## 2022-08-22 RX ADMIN — ETHACRYNIC ACID SCH: 25 TABLET ORAL at 07:53

## 2022-08-22 RX ADMIN — ACETAMINOPHEN PRN MG: 500 TABLET, FILM COATED ORAL at 02:03

## 2022-08-22 RX ADMIN — HEPARIN SODIUM SCH UNIT: 5000 INJECTION, SOLUTION INTRAVENOUS; SUBCUTANEOUS at 21:06

## 2022-08-22 RX ADMIN — HEPARIN SODIUM SCH UNIT: 5000 INJECTION, SOLUTION INTRAVENOUS; SUBCUTANEOUS at 07:50

## 2022-08-22 RX ADMIN — HYDRALAZINE HYDROCHLORIDE SCH MG: 25 TABLET, FILM COATED ORAL at 07:53

## 2022-08-22 RX ADMIN — LACTOBACILLUS TAB SCH TAB: TAB at 07:53

## 2022-08-22 RX ADMIN — ASPIRIN SCH MG: 81 TABLET, COATED ORAL at 07:52

## 2022-08-22 RX ADMIN — AMLODIPINE BESYLATE SCH MG: 5 TABLET ORAL at 07:52

## 2022-08-22 RX ADMIN — HYDRALAZINE HYDROCHLORIDE SCH MG: 25 TABLET, FILM COATED ORAL at 21:07

## 2022-08-22 RX ADMIN — Medication SCH ML: at 07:53

## 2022-08-22 RX ADMIN — FAMOTIDINE SCH MG: 20 TABLET, FILM COATED ORAL at 21:07

## 2022-08-22 RX ADMIN — FAMOTIDINE SCH MG: 20 TABLET, FILM COATED ORAL at 07:51

## 2022-08-22 NOTE — PN
Subjective:  The patient is doing much better today.  Denies any headache, nausea, vomiting, chest pa
in, abdominal pain, constipation, or diarrhea.



Objective:  Vital Signs:  Temperature 97, pulse 70, respirations 16, blood pressure 167/59. 

Lungs:  Basal crackles. 

Heart:  S1, S2.  Regular. 

Abdomen:  Soft, nontender.  Bowel sounds present. 

Extremity:  Trace edema.



Laboratory Data:  Shows WBC 23,000, hemoglobin 11.5, platelets 338.



Assessment And Plan:  Sulfa drug allergy, rash is improving.  Consider decreasing steroid dosage.  Co
ntinue supportive care.  We will continue to monitor the patient as off antibiotic at this time.  Sta
tus post treatment of cellulitis.  We will follow the patient closely.





NF/MODL

DD:  08/22/2022 15:43:05Voice ID:  [id number]

DT:  08/22/2022 16:05:19Report ID:  [id number]

## 2022-08-23 RX ADMIN — AMLODIPINE BESYLATE SCH MG: 5 TABLET ORAL at 09:28

## 2022-08-23 RX ADMIN — HEPARIN SODIUM SCH UNIT: 5000 INJECTION, SOLUTION INTRAVENOUS; SUBCUTANEOUS at 20:59

## 2022-08-23 RX ADMIN — FAMOTIDINE SCH MG: 20 TABLET, FILM COATED ORAL at 09:27

## 2022-08-23 RX ADMIN — FAMOTIDINE SCH MG: 20 TABLET, FILM COATED ORAL at 20:58

## 2022-08-23 RX ADMIN — ETHACRYNIC ACID SCH: 25 TABLET ORAL at 09:00

## 2022-08-23 RX ADMIN — HYDRALAZINE HYDROCHLORIDE SCH MG: 25 TABLET, FILM COATED ORAL at 09:28

## 2022-08-23 RX ADMIN — HEPARIN SODIUM SCH UNIT: 5000 INJECTION, SOLUTION INTRAVENOUS; SUBCUTANEOUS at 09:37

## 2022-08-23 RX ADMIN — Medication SCH ML: at 20:59

## 2022-08-23 RX ADMIN — ASPIRIN SCH MG: 81 TABLET, COATED ORAL at 09:27

## 2022-08-23 RX ADMIN — LACTOBACILLUS TAB SCH TAB: TAB at 09:28

## 2022-08-23 RX ADMIN — Medication SCH ML: at 09:00

## 2022-08-23 RX ADMIN — ACETAMINOPHEN PRN MG: 500 TABLET, FILM COATED ORAL at 02:50

## 2022-08-23 RX ADMIN — MONTELUKAST SODIUM SCH MG: 10 TABLET, FILM COATED ORAL at 20:58

## 2022-08-23 RX ADMIN — HYDRALAZINE HYDROCHLORIDE SCH MG: 25 TABLET, FILM COATED ORAL at 13:56

## 2022-08-23 RX ADMIN — HYDRALAZINE HYDROCHLORIDE SCH MG: 25 TABLET, FILM COATED ORAL at 20:58

## 2022-08-23 NOTE — PN
Subjective:  The patient lying in bed.  No new acute event.  Chart reviewed.  She has been diagnosed 
with Sweet syndrome and skin biopsy.



Objective:  Vital Signs:  Temperature 98, pulse 60, respirations 16, blood pressure 169/72. 

Lungs:  Clear to auscultation. 

Heart:  S1, S2.  Regular. 

Abdomen:  Soft, nontender.  Bowel sounds present. 

Extremity:  Scattered area of rash noted.



Laboratory Data:  Reviewed.



Assessment And Plan:  A 57-year-old female with sulfa drugs and penicillin allergies.  The patient to
 be treated on high-dose steroids for Sweet syndrome.  Continue supportive care and monitor for signs
 of infection.  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
 Guillain-Edmond syndrome.  We will continue to monitor for signs of infection.  No other recommendati
on at this time.





NF/MODL

DD:  08/23/2022 13:36:36Voice ID:  [id number]

DT:  08/23/2022 16:37:49Report ID:  [id number]

## 2022-08-23 NOTE — PN
Date of Progress Note:  08/22/2022



Chief Complaint:  Severe sepsis, acute kidney injury.



History Of Present Illness:  The patient is feeling better, she denies nausea, vomiting.



Review of Systems:

Denies chest pain, palpitations.  Denies headaches, vision changes.



Physical Examination:

Abdomen:  Soft, benign. 

Extremities:  Slight edema. 

Lungs:  Clear to auscultation bilaterally. 

Heart:  S1, S2.



Impression And Plan:  

1.Acute kidney injury secondary to prerenal state with septic acute tubular necrosis complicated by 
urinary retention.

2.Renal function has improved to baseline.  Acute kidney injury, resolved.  Continue adequate hydrat
ion.  Monitor intake and output.  Avoid nephrotoxic medication.  Continue IV fluids as needed.  Encou
rage p.o. intake as tolerated.  Patient will have bladder scan as needed to rule out urinary retentio
n.

3.Severe sepsis due to cholecystitis.  Continue to monitor.  Patient will have antibiotic.





EB/MODL

DD:  08/22/2022 20:27:55Voice ID:  [id number]

DT:  08/23/2022 01:11:10Report ID:  [id number]

## 2022-08-23 NOTE — PN
Date of Progress Note:  08/23/2022



Subjective:  The patient was admitted with acute kidney injury secondary to AIN/sulfa-induced/prerena
l, complicated with hyponatremia and hyperkalemia.  The patient treated, recovered.  Kidney function 
completely normalized.  The patient developed some over volume, given the Lasix, developed worsening 
allergy, tried on ethacrynic acid.  The patient has GI symptoms, so was held.  Currently, the patient
 on OR room air.



Physical Examination:

Vital Signs:  Blood pressure 169/72, pulse of 60, afebrile. 

Chest:  Clear to auscultation. 

Heart:  S1, S2.  Regular. 

Abdomen:  Soft, nontender. 

Extremities:  No edema. 

Neurologic:  Alert.  No focality. 

Skin:  Macular rash.



Laboratory Data:  WBC 23.5, H and H 11.5/33.8.  Sodium 134, potassium 5, bicarb 25, BUN 34, creatinin
e 0.8, GFR of 84.  Albumin 2.5, corrected calcium 9.8.



Current Medications:  The patient on include;

1.Aspirin.

2.Breathing treatment.

3.Amlodipine 10 mg.

4.Carvedilol 6.25.

5.Hydralazine.

6.Ethacrynic.

7.Pepcid.

8.Prednisone.



Assessment And Plan:  

1.Acute kidney injury, multifactorial secondary to AIN, sulfa-induced/poor perfusion ATN, recovered,
 currently normal volume.  We will continue ethacrynic as tolerated.

2.Hyperkalemia secondary to Bactrim, recovered, resolved.

3.Hyponatremia secondary to cardiorenal, resolved on the ethacrynic acid.

4.Foot infection as by primary.

5.Leukocytosis as by primary.





MA/MODL

DD:  08/23/2022 14:40:25Voice ID:  [id number]

DT:  08/23/2022 17:23:08Report ID:  [id number]

## 2022-08-24 LAB
BUN BLD-MCNC: 33 MG/DL (ref 7–18)
GIANT PLATELETS BLD QL SMEAR: (no result)
GLUCOSE SERPLBLD-MCNC: 193 MG/DL (ref 74–106)
HCT VFR BLD CALC: 38.4 % (ref 36–45)
LYMPHOCYTES # SPEC AUTO: 1 K/UL (ref 0.7–4.9)
MCV RBC: 90.5 FL (ref 80–100)
MORPHOLOGY BLD-IMP: (no result)
PMV BLD: 7.9 FL (ref 7.6–11.3)
POTASSIUM SERPL-SCNC: 5.2 MMOL/L (ref 3.5–5.1)
RBC # BLD: 4.24 M/UL (ref 3.86–4.86)

## 2022-08-24 RX ADMIN — ETHACRYNIC ACID SCH: 25 TABLET ORAL at 09:00

## 2022-08-24 RX ADMIN — HEPARIN SODIUM SCH UNIT: 5000 INJECTION, SOLUTION INTRAVENOUS; SUBCUTANEOUS at 21:13

## 2022-08-24 RX ADMIN — LACTOBACILLUS TAB SCH TAB: TAB at 09:23

## 2022-08-24 RX ADMIN — HEPARIN SODIUM SCH UNIT: 5000 INJECTION, SOLUTION INTRAVENOUS; SUBCUTANEOUS at 09:23

## 2022-08-24 RX ADMIN — FAMOTIDINE SCH MG: 20 TABLET, FILM COATED ORAL at 21:13

## 2022-08-24 RX ADMIN — MONTELUKAST SODIUM SCH MG: 10 TABLET, FILM COATED ORAL at 21:13

## 2022-08-24 RX ADMIN — HYDRALAZINE HYDROCHLORIDE SCH MG: 25 TABLET, FILM COATED ORAL at 21:14

## 2022-08-24 RX ADMIN — ACETAMINOPHEN PRN MG: 500 TABLET, FILM COATED ORAL at 15:27

## 2022-08-24 RX ADMIN — Medication SCH ML: at 09:23

## 2022-08-24 RX ADMIN — HYDRALAZINE HYDROCHLORIDE SCH MG: 25 TABLET, FILM COATED ORAL at 13:54

## 2022-08-24 RX ADMIN — AMLODIPINE BESYLATE SCH MG: 5 TABLET ORAL at 09:22

## 2022-08-24 RX ADMIN — FAMOTIDINE SCH MG: 20 TABLET, FILM COATED ORAL at 09:22

## 2022-08-24 RX ADMIN — HYDRALAZINE HYDROCHLORIDE SCH MG: 25 TABLET, FILM COATED ORAL at 09:23

## 2022-08-24 RX ADMIN — Medication SCH ML: at 21:17

## 2022-08-24 RX ADMIN — ASPIRIN SCH MG: 81 TABLET, COATED ORAL at 09:23

## 2022-08-24 NOTE — PN
Date of Progress Note:  08/24/2022



Subjective:  The patient was admitted with acute kidney injury secondary to AIN.  The patient was pedro luis
ated with Decadron.  The patient has been recovering.  Her skin rash has been improving.  Blood press
ure slightly better, but still elevated.  The patient was given Lasix, developed allergic reaction ag
ain to sulfa, and the patient could not tolerate ethacrynic acid.



Physical Examination:

Vital Signs:  Blood pressure 180/61, pulse of 65, afebrile. 

Chest:  Clear to auscultation. 

Heart:  S1, S2.  Regular. 

Abdomen:  Morbidly obese.  Could not appreciate any organomegaly. 

Extremity:  Trace edema. 

Neurologic:  Alert.  No focality.



Laboratory Data:  WBC 16.8, H and H 12.5/38.4.  Sodium 134, potassium 5.2, bicarb 25, BUN 33, creatin
ine 0.8, calcium 8.9.



Current Medications:  The patient on include;

1.Tylenol.

2.Amlodipine 10 mg.

3.Carvedilol 6.25 b.i.d.

4.Ethacrynic acid.

5.Hydralazine 25 t.i.d.

6.Zofran.

7.Prednisone 20 b.i.d.



Assessment And Plan:  

1.Acute kidney injury secondary to AIN, toxic ATN, recovered, resolved.  Normal volume.  We will con
tinue to monitor.

2.Hypertension.  We will increase carvedilol to 12.5, increase hydralazine to 50 t.i.d., and we will
 monitor.

3.Foot infection as by primary.

4.Hyperkalemia, marginal.  We will monitor.

5.Hyponatremia dilutional secondary to cardiorenal.  We will continue p.r.n. ethacrynic.





MA/MODL

DD:  08/24/2022 12:15:44Voice ID:  [id number]

DT:  08/24/2022 13:27:35Report ID:  [id number]

## 2022-08-24 NOTE — P.PN
Date of Service: 08/23/22





Subjective


Pt continues to improve





Review of Systems


10-point ROS is otherwise unremarkable





Physical Examination





- Vital Signs


  Reviewed





- Physical Exam


General: Alert, In no apparent distress, Oriented x3


Respiratory: Clear to auscultation bilaterally, Normal air movement


Cardiovascular: Regular rate/rhythm, Normal S1 S2


Gastrointestinal: Normal bowel sounds, Soft and benign, Non-distended, No 

tenderness


Integumentary: rash improved


Neurological: no focal deficits





Assessment & Plan





- Problems (Diagnosis)


(1) Sweet's syndrome


Current Visit: Yes   Status: Acute   





(2) NICK (acute kidney injury)


Current Visit: Yes   Status: Acute   





(3) Elevated troponin


Current Visit: Yes   Status: Acute   





(4) Allergy to trimethoprim/sulfamethoxazole


Current Visit: Yes   Status: Acute   





- Plan


Continue with POC as mentioned below:


1. Continue with oral steroids


2. Heplock IV


3. Continue with PT


4. Renal function is stable


5. GI/ DVT prophylaxis








Discharge Plan: Nursing Home


Plan to discharge in: Greater than 2 days





- Advance Directives


Does patient have a Living Will: Yes


Does patient have a Durable POA for Healthcare: No





- Code Status/Comfort Care


Code Status: Full Code


Critical Care: No


Time Spent Managing PTS Care (In Minutes): 35

## 2022-08-24 NOTE — P.PN
Date of Service: 08/24/22





Subjective


Patient continues to improve.  Clinical symptoms are much better.  Awaiting 

acceptance to rehab.





Review of Systems


10-point ROS is otherwise unremarkable





Physical Examination





- Vital Signs


  Reviewed





- Physical Exam


General: Alert, In no apparent distress, Oriented x3


Respiratory: Clear to auscultation bilaterally, Normal air movement


Cardiovascular: Regular rate/rhythm, Normal S1 S2


Gastrointestinal: Normal bowel sounds, Soft and benign, Non-distended, No 

tenderness


Integumentary: rash improved


Neurological: no focal deficits





Assessment & Plan





- Problems (Diagnosis)


(1) Sweet's syndrome


Current Visit: Yes   Status: Acute   





(2) NICK (acute kidney injury)


Current Visit: Yes   Status: Acute   





(3) Elevated troponin


Current Visit: Yes   Status: Acute   





(4) Allergy to trimethoprim/sulfamethoxazole


Current Visit: Yes   Status: Acute   





- Plan


Continue with current POC:


1. Continue with oral steroids


2. Heplock IV


3. PT


4. Repeat renal function testing


5. GI/ DVT prophylaxis








Discharge Plan: Nursing Home


Plan to discharge in: Greater than 2 days





- Advance Directives


Does patient have a Living Will: Yes


Does patient have a Durable POA for Healthcare: No





- Code Status/Comfort Care


Code Status: Full Code


Critical Care: No


Time Spent Managing PTS Care (In Minutes): 35

## 2022-08-24 NOTE — P.PN
Subjective


Date of Service: 08/22/22


  Patient's rash is significantly improved.  Plan to transfer to a skilled 

nursing facility.  Trying to ambulate with physical therapy.  Patient's 

pathology report pending.





Review of Systems


10-point ROS is otherwise unremarkable





Physical Examination





- Vital Signs


Temperature: 98.5 F


Blood Pressure: 146/60


Pulse: 58


Respirations: 16


Pulse Ox (%): 96





- Physical Exam


General: Alert, In no apparent distress, Oriented x3


HEENT: Atraumatic, PERRLA, EOMI


Neck: Supple, JVD not distended


Respiratory: Clear to auscultation bilaterally, Normal air movement


Cardiovascular: Regular rate/rhythm, Normal S1 S2


Gastrointestinal: Normal bowel sounds, Soft and benign, Non-distended, No 

tenderness


Integumentary: Rash(es)


Neurological: Sensation intact, Cranial nerves 3-12 intact, Normal affect


Lymphatics: No axilla or inguinal lymphadenopathy





- Studies


Medications List Reviewed: Yes





Assessment & Plan





- Problems (Diagnosis)


(1) Sweet's syndrome


Current Visit: Yes   Status: Acute   





(2) NICK (acute kidney injury)


Current Visit: Yes   Status: Acute   





(3) Elevated troponin


Current Visit: Yes   Status: Acute   





(4) Allergy to trimethoprim/sulfamethoxazole


Current Visit: Yes   Status: Acute   





- Plan





Plan:


1. Continue with oral steroids


2. Heplock IV


3. Autoimmune antibodies pending


4. Repeat renal function testing


5. GI/ DVT prophylaxis








Discharge Plan: Nursing Home


Plan to discharge in: Greater than 2 days





- Advance Directives


Does patient have a Living Will: Yes


Does patient have a Durable POA for Healthcare: No





- Code Status/Comfort Care


Code Status: Full Code


Critical Care: No


Time Spent Managing PTS Care (In Minutes): 35

## 2022-08-25 VITALS — OXYGEN SATURATION: 98 %

## 2022-08-25 LAB
BUN BLD-MCNC: 39 MG/DL (ref 7–18)
GLUCOSE SERPLBLD-MCNC: 194 MG/DL (ref 74–106)
HCT VFR BLD CALC: 38.4 % (ref 36–45)
LYMPHOCYTES # SPEC AUTO: 1 K/UL (ref 0.7–4.9)
MAGNESIUM SERPL-MCNC: 2.3 MG/DL (ref 1.8–2.4)
MCV RBC: 90.4 FL (ref 80–100)
PMV BLD: 8.1 FL (ref 7.6–11.3)
POTASSIUM SERPL-SCNC: 5 MMOL/L (ref 3.5–5.1)
RBC # BLD: 4.25 M/UL (ref 3.86–4.86)

## 2022-08-25 RX ADMIN — FAMOTIDINE SCH MG: 20 TABLET, FILM COATED ORAL at 08:27

## 2022-08-25 RX ADMIN — HYDRALAZINE HYDROCHLORIDE SCH MG: 25 TABLET, FILM COATED ORAL at 21:49

## 2022-08-25 RX ADMIN — MONTELUKAST SODIUM SCH MG: 10 TABLET, FILM COATED ORAL at 21:49

## 2022-08-25 RX ADMIN — LACTOBACILLUS TAB SCH TAB: TAB at 08:28

## 2022-08-25 RX ADMIN — HEPARIN SODIUM SCH UNIT: 5000 INJECTION, SOLUTION INTRAVENOUS; SUBCUTANEOUS at 21:49

## 2022-08-25 RX ADMIN — DIPHENHYDRAMINE HYDROCHLORIDE PRN MG: 50 INJECTION, SOLUTION INTRAMUSCULAR; INTRAVENOUS at 21:50

## 2022-08-25 RX ADMIN — ASPIRIN SCH MG: 81 TABLET, COATED ORAL at 08:28

## 2022-08-25 RX ADMIN — Medication SCH ML: at 08:28

## 2022-08-25 RX ADMIN — HEPARIN SODIUM SCH UNIT: 5000 INJECTION, SOLUTION INTRAVENOUS; SUBCUTANEOUS at 08:28

## 2022-08-25 RX ADMIN — FAMOTIDINE SCH MG: 20 TABLET, FILM COATED ORAL at 21:49

## 2022-08-25 RX ADMIN — HYDRALAZINE HYDROCHLORIDE SCH MG: 25 TABLET, FILM COATED ORAL at 13:49

## 2022-08-25 RX ADMIN — Medication SCH ML: at 21:50

## 2022-08-25 RX ADMIN — HYDRALAZINE HYDROCHLORIDE SCH MG: 25 TABLET, FILM COATED ORAL at 08:28

## 2022-08-25 RX ADMIN — AMLODIPINE BESYLATE SCH MG: 5 TABLET ORAL at 08:27

## 2022-08-25 NOTE — PN
Date of Progress Note:  08/25/2022



Subjective:  The patient was admitted with acute kidney injury secondary to AIN secondary to sulfa, c
omplicated with hyperkalemia and hyponatremia.  The patient had foot infection.  The patient was charles
marifer.  Kidney function has been normalized.  Fluid status has been stabilized.



Physical Examination:

Vital Signs:  Blood pressure 137/65, pulse of 60, afebrile. 

Chest:  Clear to auscultation. 

Heart:  S1, S2 regular. 

Abdomen:  Soft, nontender. 

Extremity:  Trace edema. 

Neurologic:  Alert and oriented x3.  No focality. 

Skin:  Macular rash.



Laboratory Data:  WBC 14.9, H and H of 12.8/38.8.  Sodium 134, potassium 5, bicarb 25, BUN 39, creati
nine 0.8.  Calcium 9.8, magnesium 2.3.



Current Medications:  The patient on include;

1.Aspirin.

2.Diphenhydramine.

3.Carvedilol 6.25 b.i.d.

4.Hydralazine 25 t.i.d.

5.Zofran.



Assessment And Plan:  

1.Acute kidney injury secondary to AIN and sulfa, recovered, resolved.

2.Over volume:  Could not tolerate diuresis with sulfa found, placed on ethacrynic.  We will follow 
up.

3.Hyperkalemia, resolved.

4.Hypertension, controlled, optimal.  Continue current medication.

5.Hyponatremia secondary to AIN, resolved.

6.Foot infection.  We will follow up with the primary.





DYLAN

DD:  08/25/2022 12:13:20Voice ID:  [id number]

DT:  08/25/2022 17:28:19Report ID:  [id number]

## 2022-08-25 NOTE — PN
Subjective:  The patient is lying in bed.  No new acute event.  Chart reviewed.  Feeling much better 
today.



Objective:  Vital Signs:  Temperature 97, pulse 60, respirations 18, blood pressure 137/65. 

Lungs:  Basal crackles. 

Heart:  S1, S2.  Regular. 

Abdomen:  Soft, nontender.  Bowel sounds present. 

Extremity:  Trace edema.



Laboratory Data:  Shows WBC down to 14.9 from 23,000, hemoglobin 12.8, platelets are 379.  Chemistry 
shows sodium 134, potassium 5, chloride 103, bicarb 25, BUN 39, creatinine 0.8, glucose 194.



Assessment And Plan:  Leukocytosis, leukemoid reaction, most likely secondary to steroid therapy.  Th
e patient's rash has been improving steadily since the patient is off sulfa drugs.  Sweet syndrome, c
ontinue supportive care.  Continue symptomatic management.  We will follow the patient closely.





NF/MODL

DD:  08/25/2022 13:29:38Voice ID:  [id number]

DT:  08/25/2022 18:21:19Report ID:  [id number]

## 2022-08-26 ENCOUNTER — HOSPITAL ENCOUNTER (INPATIENT)
Dept: HOSPITAL 97 - 5TH | Age: 57
LOS: 15 days | Discharge: HOME | DRG: 948 | End: 2022-09-10
Attending: PSYCHIATRY & NEUROLOGY | Admitting: PSYCHIATRY & NEUROLOGY
Payer: COMMERCIAL

## 2022-08-26 VITALS — BODY MASS INDEX: 46.7 KG/M2

## 2022-08-26 DIAGNOSIS — B35.1: ICD-10-CM

## 2022-08-26 DIAGNOSIS — Z20.822: ICD-10-CM

## 2022-08-26 DIAGNOSIS — I10: ICD-10-CM

## 2022-08-26 DIAGNOSIS — Z79.899: ICD-10-CM

## 2022-08-26 DIAGNOSIS — Z79.52: ICD-10-CM

## 2022-08-26 DIAGNOSIS — Z88.1: ICD-10-CM

## 2022-08-26 DIAGNOSIS — I70.91: ICD-10-CM

## 2022-08-26 DIAGNOSIS — R53.81: Primary | ICD-10-CM

## 2022-08-26 DIAGNOSIS — Z88.5: ICD-10-CM

## 2022-08-26 DIAGNOSIS — E78.5: ICD-10-CM

## 2022-08-26 DIAGNOSIS — Z79.82: ICD-10-CM

## 2022-08-26 DIAGNOSIS — Z88.8: ICD-10-CM

## 2022-08-26 DIAGNOSIS — L60.2: ICD-10-CM

## 2022-08-26 LAB
BUN BLD-MCNC: 44 MG/DL (ref 7–18)
GLUCOSE SERPLBLD-MCNC: 175 MG/DL (ref 74–106)
HCT VFR BLD CALC: 39.4 % (ref 36–45)
LYMPHOCYTES # SPEC AUTO: 1 K/UL (ref 0.7–4.9)
MCV RBC: 89 FL (ref 80–100)
PMV BLD: 7.8 FL (ref 7.6–11.3)
POTASSIUM SERPL-SCNC: 5.1 MMOL/L (ref 3.5–5.1)
RBC # BLD: 4.42 M/UL (ref 3.86–4.86)

## 2022-08-26 PROCEDURE — 97110 THERAPEUTIC EXERCISES: CPT

## 2022-08-26 PROCEDURE — 81001 URINALYSIS AUTO W/SCOPE: CPT

## 2022-08-26 PROCEDURE — 97542 WHEELCHAIR MNGMENT TRAINING: CPT

## 2022-08-26 PROCEDURE — 97112 NEUROMUSCULAR REEDUCATION: CPT

## 2022-08-26 PROCEDURE — 83735 ASSAY OF MAGNESIUM: CPT

## 2022-08-26 PROCEDURE — 85025 COMPLETE CBC W/AUTO DIFF WBC: CPT

## 2022-08-26 PROCEDURE — 87086 URINE CULTURE/COLONY COUNT: CPT

## 2022-08-26 PROCEDURE — 87186 SC STD MICRODIL/AGAR DIL: CPT

## 2022-08-26 PROCEDURE — 97535 SELF CARE MNGMENT TRAINING: CPT

## 2022-08-26 PROCEDURE — 97161 PT EVAL LOW COMPLEX 20 MIN: CPT

## 2022-08-26 PROCEDURE — 97116 GAIT TRAINING THERAPY: CPT

## 2022-08-26 PROCEDURE — 87077 CULTURE AEROBIC IDENTIFY: CPT

## 2022-08-26 PROCEDURE — 80048 BASIC METABOLIC PNL TOTAL CA: CPT

## 2022-08-26 PROCEDURE — 84134 ASSAY OF PREALBUMIN: CPT

## 2022-08-26 PROCEDURE — 97530 THERAPEUTIC ACTIVITIES: CPT

## 2022-08-26 PROCEDURE — 87088 URINE BACTERIA CULTURE: CPT

## 2022-08-26 PROCEDURE — 36415 COLL VENOUS BLD VENIPUNCTURE: CPT

## 2022-08-26 PROCEDURE — 82040 ASSAY OF SERUM ALBUMIN: CPT

## 2022-08-26 PROCEDURE — 97165 OT EVAL LOW COMPLEX 30 MIN: CPT

## 2022-08-26 RX ADMIN — HYDRALAZINE HYDROCHLORIDE SCH MG: 25 TABLET, FILM COATED ORAL at 08:41

## 2022-08-26 RX ADMIN — ASPIRIN SCH MG: 81 TABLET, COATED ORAL at 08:42

## 2022-08-26 RX ADMIN — HYDRALAZINE HYDROCHLORIDE SCH MG: 25 TABLET, FILM COATED ORAL at 20:57

## 2022-08-26 RX ADMIN — AMLODIPINE BESYLATE SCH MG: 5 TABLET ORAL at 08:41

## 2022-08-26 RX ADMIN — RIVAROXABAN SCH MG: 10 TABLET, FILM COATED ORAL at 17:38

## 2022-08-26 RX ADMIN — FAMOTIDINE SCH MG: 20 TABLET, FILM COATED ORAL at 20:57

## 2022-08-26 RX ADMIN — HEPARIN SODIUM SCH UNIT: 5000 INJECTION, SOLUTION INTRAVENOUS; SUBCUTANEOUS at 08:42

## 2022-08-26 RX ADMIN — LACTOBACILLUS TAB SCH TAB: TAB at 08:41

## 2022-08-26 RX ADMIN — MONTELUKAST SODIUM SCH MG: 10 TABLET, FILM COATED ORAL at 20:57

## 2022-08-26 RX ADMIN — Medication SCH ML: at 20:56

## 2022-08-26 RX ADMIN — HYDRALAZINE HYDROCHLORIDE SCH MG: 25 TABLET, FILM COATED ORAL at 13:51

## 2022-08-26 RX ADMIN — DIPHENHYDRAMINE HYDROCHLORIDE PRN MG: 25 CAPSULE ORAL at 20:57

## 2022-08-26 RX ADMIN — FAMOTIDINE SCH MG: 20 TABLET, FILM COATED ORAL at 08:41

## 2022-08-26 RX ADMIN — Medication SCH ML: at 08:42

## 2022-08-26 NOTE — PN
Date of Progress Note:  08/26/2022



Subjective:  Seen at bedside, doing clinically well.  Rash has resolved and no chest pain.



Review of Systems:

No chest pain, shortness of breath, orthopnea, cough.  No nausea, vomiting, diarrhea.  No abdominal p
ain.  No dysuria, polyuria, or urinary urgency.  No skin rash.  All other systems reviewed and they a
re negative.



Physical Examination:

Vital Signs:  Reviewed. 

Head and Neck:  Pupils are equal, reactive to light.  Intact eye movements.  No JVD.  No cervical lym
phadenopathy. 

NECK:  Supple.  Thyroid is not enlarged. 

LUNGS:  Clear to auscultation bilaterally.  No rhonchi, wheezing, or crackle.  No accessory muscle us
e. 

Heart:  Regular rate and rhythm.  No extra sounds. 

Abdomen:  Soft, nontender.  Bowel sounds positive.  No organomegaly.  No masses or hernia.  No rigidi
ty or rebound. 

Extremities:  No clubbing or cyanosis.  Intact pulses. 

Skin:  The rash has almost resolved. 

Neurologic:  Alert, awake, oriented x3.  No acute focal deficits appreciated.



Investigations:  Labs were reviewed.



Assessment And Recommendation:  

1.Elevated troponin during the recent hospitalization.  The patient has no further chest pain.  No p
ethan for outpatient stress test and 

an echocardiogram.

2.Skin rash, resolving.  Continue current management.





SR/MODL

DD:  08/26/2022 15:16:04Voice ID:  [id number]

DT:  08/26/2022 20:41:35Report ID:  [id number]

## 2022-08-26 NOTE — P.PN
Subjective


Date of Service: 08/26/22


Chief Complaint: Generalized rash weakness


Subjective: Improving (Patient is improving doing well has been in hospital for 

13 days ending transfer to rehab)





Review of Systems


10-point ROS is otherwise unremarkable


General: Weakness





Physical Examination





- Vital Signs


Temperature: 97.8 F


Blood Pressure: 131/62


Pulse: 64


Respirations: 16


Pulse Ox (%): 97





- Physical Exam


General: Alert, Oriented x3


HEENT: Atraumatic


Neck: Supple


Cardiovascular: No edema, Normal S1 S2


Integumentary: Rash(es) (She has generalized rash)





- Studies


Medications List Reviewed: Yes





Assessment And Plan





- Current Problems (Diagnosis)


(1) Sweet's syndrome


Current Visit: Yes   Status: Acute   


Plan: 


Patient has generalized rash slow tapering of steroids no evidence of active 

sepsis all cultures are negative diagnostic data is unremarkable and is feeling 

well apart from weakness vital signs oxygenation satisfactory renal function has

improved back to normal white count is mildly elevated she is on prednisone 20 

mg twice a day foot MRI negative ultrasound of the kidneys no obstruction is a 

biopsy done recently patient was treated with Bactrim may have contributed to 

the Sweet syndrome she was initially admitted with lower extremity cellulitis 

also history of Guillain-Barr syndrome low-dose Xarelto for DVT prophylaxis

## 2022-08-26 NOTE — P.PN
Subjective


Date of Service: 08/26/22


Chief Complaint: Generalized rash weakness


Subjective: No new changes





Physical Examination





- Vital Signs


Temperature: 97.8 F


Blood Pressure: 131/62


Pulse: 64


Respirations: 16


Pulse Ox (%): 97





- Physical Exam


General: In no apparent distress


HEENT: Atraumatic, Normocephalic


Neck: Supple


Respiratory: Other (symmetric chest expansion)


Cardiovascular: No rubs, No murmurs


Gastrointestinal: Soft and benign, No guarding


Musculoskeletal: No clubbing


Integumentary: No warmth


Neurological: Normal speech, Normal tone


Urinary: Other (no bladder distention)


External genitalia: Deferred


Rectal: Deferred





- Studies


Medications List Reviewed: Yes





Assessment And Plan





- Plan





1.   Acute kidney injury secondary to AIN and sulfa, recovered, resolved.  

liberal by mouth fluid intake.


2.   Over volume:  Could not tolerate diuresis with sulfa found, placed on 

ethacrynic.  We will follow up.


3.   Hyperkalemia, resolved.


4.   Hypertension, controlled, optimal.  Continue current medication.


5.   Hyponatremia secondary to AIN, resolved.


6.   Severe sepsis 2/2 DM foot wound.   We will follow up with the primary.


7.   Hx of GBS.  PT/OT.


8.   Dispo.  Sign off today.

## 2022-08-26 NOTE — P.PN
Date of Service: 08/25/22





Subjective


Pt s clinically doing much better.  She actually got out of bed and stood 10 

times today.  Her rash is improved significantly as well.  Continue with 

tapering dose of steroid and awaiting for acceptance to inpatient rehab.





Review of Systems


10-point ROS is otherwise unremarkable





Physical Examination





- Vital Signs


Reviewed





- Physical Exam


General: Alert, In no apparent distress, Oriented x3


Respiratory: Clear to auscultation bilaterally, Normal air movement


Cardiovascular: Regular rate/rhythm, Normal S1 S2


Gastrointestinal: Normal bowel sounds, Soft and benign, Non-distended, No 

tenderness


Integumentary: rash improved


Neurological: strength in the lower extremity improved as well





Assessment & Plan





- Problems (Diagnosis)


(1) Sweet's syndrome


Current Visit: Yes   Status: Acute   





(2) NICK (acute kidney injury)


Current Visit: Yes   Status: Acute   





(3) Elevated troponin


Current Visit: Yes   Status: Acute   





(4) Allergy to trimethoprim/sulfamethoxazole


Current Visit: Yes   Status: Acute   





(5) Generalized weakness


Current Visit: Yes   Status: Acute   





- Plan


Continue with current POC:


1. Continue with tapering dose of oral steroids


2. Heplock IV


3. PT and continue with OOB and ambulate


4. Continue with monitoring renal function


5. GI/ DVT prophylaxis








Discharge Plan: Nursing Home/Inpt rehab


Plan to discharge in: Greater than 2 days





- Advance Directives


Does patient have a Living Will: Yes


Does patient have a Durable POA for Healthcare: No





- Code Status/Comfort Care


Code Status: Full Code


Critical Care: No


Time Spent Managing PTS Care (In Minutes): 35

## 2022-08-27 RX ADMIN — FAMOTIDINE SCH MG: 20 TABLET, FILM COATED ORAL at 20:42

## 2022-08-27 RX ADMIN — MONTELUKAST SODIUM SCH MG: 10 TABLET, FILM COATED ORAL at 20:42

## 2022-08-27 RX ADMIN — ACETAMINOPHEN PRN MG: 500 TABLET, FILM COATED ORAL at 06:42

## 2022-08-27 RX ADMIN — HYDRALAZINE HYDROCHLORIDE SCH MG: 25 TABLET, FILM COATED ORAL at 13:10

## 2022-08-27 RX ADMIN — DIPHENHYDRAMINE HYDROCHLORIDE PRN MG: 25 CAPSULE ORAL at 06:42

## 2022-08-27 RX ADMIN — ASPIRIN SCH MG: 81 TABLET, COATED ORAL at 09:53

## 2022-08-27 RX ADMIN — RIVAROXABAN SCH MG: 10 TABLET, FILM COATED ORAL at 16:24

## 2022-08-27 RX ADMIN — HYDRALAZINE HYDROCHLORIDE SCH MG: 25 TABLET, FILM COATED ORAL at 09:53

## 2022-08-27 RX ADMIN — FAMOTIDINE SCH MG: 20 TABLET, FILM COATED ORAL at 09:53

## 2022-08-27 RX ADMIN — HYDRALAZINE HYDROCHLORIDE SCH MG: 25 TABLET, FILM COATED ORAL at 20:42

## 2022-08-27 RX ADMIN — LACTOBACILLUS TAB SCH TAB: TAB at 09:53

## 2022-08-27 RX ADMIN — Medication SCH: at 21:00

## 2022-08-27 RX ADMIN — AMLODIPINE BESYLATE SCH MG: 5 TABLET ORAL at 09:53

## 2022-08-27 RX ADMIN — Medication SCH: at 09:00

## 2022-08-27 NOTE — PN
Date of Progress Note:  08/27/2022



Subjective:  The patient was admitted with acute kidney injury secondary to AIN 
secondary to Bactrim.  The patient recovered.



Physical Examination:

Vital Signs:  Blood pressure 131/62, pulse of 64, afebrile. 

Chest:  Decreased entry bilateral base. 

Heart:  S1, S2.  Regular. 

Abdomen:  Morbidly obese.  Could not appreciate any organomegaly. 

Extremities:  Plus edema. 

Neurologic:  Alert.  No focality.



Laboratory Data:  WBC 11.2,   Sodium 135, potassium 5.1, bicarb 23, BUN 44, 
creatinine 0.9, calcium 9.3.



Current Medications:  The patient on include diphenhydramine, aspirin, Xarelto, 
amlodipine 10 mg, carvedilol 6.25, hydralazine 25 t.i.d., Pepcid, Zofran, 
prednisone down to 10 b.i.d., flecainide, Singulair.



Assessment And Plan:  

1.   Acute kidney injury, multifactorial, secondary to Bactrim/AIN, recovered, 
resolved.

2.   Hypertension, controlled, optimal.

3.   Hyperkalemia, resolved.

4.   Hyponatremia, dilutional.  Has been resolved.

5.   Deconditioning.  Continue PT/OT.

Time spent examining the patient face-to-face, reviewing the data lab and 
radiology, placing orders, discussing with the patient, explaining risks, 
benefits, alternatives, discussing with the staff member including nursing 
discussing with the hospitalist more than 35 minutes.



DYLAN

DD:  08/27/2022 12:29:59   Voice ID:  [id number]

DT:  08/27/2022 13:26:51   Report ID:  [id number]

NATALYA

## 2022-08-27 NOTE — P.PN
Subjective


Date of Service: 08/27/22


Chief Complaint: Generalized rash weakness


Subjective: Improving (Patient is doing better rash is improved less some lower 

extremity weakness)





Review of Systems


Unremarkable





Physical Examination





- Vital Signs


Temperature: 97.8 F


Blood Pressure: 131/62


Pulse: 64


Respirations: 16


Pulse Ox (%): 97





- Physical Exam


General: Alert, In no apparent distress, Oriented x3


Respiratory: Clear to auscultation bilaterally


Cardiovascular: No edema


Gastrointestinal: Normal bowel sounds, Soft and benign


Integumentary: Other (Genital rash has improved significantly she says some 

itching on her palms)





- Studies


Medications List Reviewed: Yes





Assessment And Plan





- Current Problems (Diagnosis)


(1) Sweet's syndrome


Current Visit: Yes   Status: Acute   


Plan: 


Patient is improving and to reduce the dose of prednisone to 10 mg twice a day 

and slow taper in 1 to 2 weeks chemistries and labs reviewed potassium is 

borderline upper limit of normal patient has hypertension is on hydralazine 

managed by nephrology

## 2022-08-28 LAB
BUN BLD-MCNC: 36 MG/DL (ref 7–18)
GLUCOSE SERPLBLD-MCNC: 132 MG/DL (ref 74–106)
HCT VFR BLD CALC: 37.2 % (ref 36–45)
MCV RBC: 89.6 FL (ref 80–100)
PMV BLD: 7.7 FL (ref 7.6–11.3)
POTASSIUM SERPL-SCNC: 4.6 MMOL/L (ref 3.5–5.1)
RBC # BLD: 4.15 M/UL (ref 3.86–4.86)

## 2022-08-28 RX ADMIN — LACTOBACILLUS TAB SCH TAB: TAB at 08:10

## 2022-08-28 RX ADMIN — VALSARTAN SCH MG: 160 TABLET ORAL at 10:02

## 2022-08-28 RX ADMIN — HYDRALAZINE HYDROCHLORIDE SCH MG: 25 TABLET, FILM COATED ORAL at 08:11

## 2022-08-28 RX ADMIN — VALSARTAN SCH MG: 160 TABLET ORAL at 21:36

## 2022-08-28 RX ADMIN — Medication SCH: at 08:11

## 2022-08-28 RX ADMIN — FAMOTIDINE SCH MG: 20 TABLET, FILM COATED ORAL at 21:36

## 2022-08-28 RX ADMIN — Medication SCH ML: at 21:37

## 2022-08-28 RX ADMIN — AMLODIPINE BESYLATE SCH MG: 5 TABLET ORAL at 08:11

## 2022-08-28 RX ADMIN — RIVAROXABAN SCH MG: 10 TABLET, FILM COATED ORAL at 17:03

## 2022-08-28 RX ADMIN — MONTELUKAST SODIUM SCH MG: 10 TABLET, FILM COATED ORAL at 21:36

## 2022-08-28 RX ADMIN — FAMOTIDINE SCH MG: 20 TABLET, FILM COATED ORAL at 08:11

## 2022-08-28 RX ADMIN — ASPIRIN SCH MG: 81 TABLET, COATED ORAL at 08:10

## 2022-08-28 NOTE — P.PN
Subjective


Date of Service: 08/28/22


Chief Complaint: Sweet syndrome hypertension


Subjective: Improving (Improving no complaints still has weakness)





Review of Systems


Unremarkable





Physical Examination





- Vital Signs


Temperature: 96.5 F


Blood Pressure: 141/75


Pulse: 75


Respirations: 18


Pulse Ox (%): 98





- Physical Exam


General: Alert, In no apparent distress, Oriented x3


Respiratory: Clear to auscultation bilaterally


Cardiovascular: No edema


Integumentary: Other (Rash has improved significantly minimal changes now noted)





- Studies


Medications List Reviewed: Yes





Assessment And Plan





- Current Problems (Diagnosis)


(1) Sweet's syndrome


Current Visit: Yes   Status: Acute   


Plan: 


Doing much better continue with the low-dose prednisone plan to taper








(2) Hypertension


Current Visit: Yes   Status: Acute   


Plan: 


Now controlled changed to valsartan increase carvedilol DC hydralazine labs 

ordered kidney function normal

## 2022-08-28 NOTE — PN
Date of Progress Note:  08/28/2022



Subjective:  Seen by bedside.  Doing well.  No complaints.  She is still weak, however, not moving ar
ound to assess her functional capacity.



Review of Systems:

No chest pain, shortness of breath, orthopnea, or cough.  No nausea, vomiting, diarrhea.  All other s
ystems reviewed and they were negative.



Physical Examination:

Vital Signs:  Reviewed. 

Head and Neck:  Pupils are equal, reactive to light.  Intact eye movements.  No JVD.  No cervical lym
phadenopathy.  Neck is supple.  Thyroid is not enlarged. 

Lungs:  Clear to auscultation bilaterally.  No rhonchi, wheezing, or crackles.  No accessory muscle u
se. 

Heart:  Regular rate and rhythm.  No extra sounds. 

Abdomen:  Soft, nontender.  Bowel sounds positive.  No organomegaly.  No masses or hernia.  No rigidi
ty or rebound. 

Extremities:  No edema, clubbing or cyanosis.  Intact pulses. 

Skin:  No rash. 

Neurologic:  Alert, awake, oriented x3.  No acute focal deficits appreciated.



Investigations:  Labs were reviewed.



Assessment And Recommendations:  

1.Elevated troponin at the initial presentation and the patient is 

asymptomatic.  Outpatient workup will be planned.

2.Hypertension.  Blood pressure is controlled.





SR/MODL

DD:  08/28/2022 12:45:59Voice ID:  [id number]

DT:  08/28/2022 14:00:23Report ID:  [id number]

## 2022-08-28 NOTE — PN
Date of Progress Note:  08/28/2022



Subjective:  The patient was admitted with acute kidney injury secondary to AIN, Bactrim induced.  Th
e patient had over volume.  Has allergic reaction to sulfa including diuresis.  The patient could not
 tolerate either ethacrynic acid.  The patient's kidney function anyhow recovered.



Physical Examination:

Vital Signs:  Blood pressure 127/58, pulse of 60, afebrile.  The patient had good urine output of 310
0. 

Chest:  Decreased entry bilateral base. 

Heart:  S1, S2.  Regular. 

Abdomen:  Morbidly obese.  Could not appreciate any organomegaly. 

Extremities:  Plus edema. 

Neurologic:  Alert.  No focality.



Laboratory Data:  WBC 11.1, H and H 12.5/37.2.  Sodium 137, potassium 4.6, bicarb 23, BUN 36, creatin
ine 0.8, calcium 8.5.



Current Medications:  The patient on include;

1.Aspirin.

2.Diphenhydramine.

3.Amlodipine 10 mg.

4.Carvedilol 12.5.

5.Diovan.

6.Pepcid.

7.Prednisone.

8.Singulair.



Assessment And Plan:  

1.Acute kidney injury, multifactorial, secondary to sulfa/AIN, recovered, resolved.

2.Hypertension, currently controlled, optimal.  Continue current medication.

3.Edema.  Unfortunately, the patient has severe allergy to sulfa, could not tolerate ethacrynic acid
.  I am going to continue to monitor.

4.Deconditioning.  Continue PT, OT.

5.Hyperkalemia, resolved.





MA/MODL

DD:  08/28/2022 12:51:57Voice ID:  [id number]

DT:  08/28/2022 14:41:01Report ID:  [id number]

## 2022-08-29 VITALS — SYSTOLIC BLOOD PRESSURE: 154 MMHG | DIASTOLIC BLOOD PRESSURE: 53 MMHG | TEMPERATURE: 97 F

## 2022-08-29 RX ADMIN — FAMOTIDINE SCH MG: 20 TABLET, FILM COATED ORAL at 08:54

## 2022-08-29 RX ADMIN — FAMOTIDINE SCH MG: 20 TABLET, FILM COATED ORAL at 19:02

## 2022-08-29 RX ADMIN — Medication SCH: at 08:56

## 2022-08-29 RX ADMIN — AMLODIPINE BESYLATE SCH MG: 5 TABLET ORAL at 08:55

## 2022-08-29 RX ADMIN — LACTOBACILLUS TAB SCH TAB: TAB at 08:53

## 2022-08-29 RX ADMIN — VALSARTAN SCH MG: 160 TABLET ORAL at 08:54

## 2022-08-29 RX ADMIN — RIVAROXABAN SCH MG: 10 TABLET, FILM COATED ORAL at 17:18

## 2022-08-29 RX ADMIN — ACETAMINOPHEN PRN MG: 500 TABLET, FILM COATED ORAL at 20:31

## 2022-08-29 RX ADMIN — MONTELUKAST SODIUM SCH MG: 10 TABLET, FILM COATED ORAL at 19:02

## 2022-08-29 RX ADMIN — ASPIRIN SCH MG: 81 TABLET, COATED ORAL at 08:53

## 2022-08-29 RX ADMIN — VALSARTAN SCH MG: 160 TABLET ORAL at 19:02

## 2022-08-29 NOTE — XMS REPORT
Continuity of Care Document

                           Created on:2022



Patient:NICOLE BUNN

Sex:Female

:1965

External Reference #:[id number]





Demographics







                          Address                   [address] [place] [address]

 

                          Home Phone                [phone]

 

                          Mobile Phone              [phone]

 

                          Email Address             [email or web address]

 

                          Preferred Language        Unknown

 

                          Marital Status            Unknown

 

                          Sikh Affiliation     Unknown

 

                          Race                      Unknown

 

                          Additional Race(s)        Unavailable

 

                          Ethnic Group              Unknown









Author







                          Organization              [facility]

t

 

                          Address                   [address]



                                                    [place] [address]

 

                          Phone                     [phone]









Support







                Name            Relationship    Address         Phone

 

                1               CLARITZA          Unavailable     Unavailable

 

                2               Unavailable     Unavailable     Unavailable

 

                CLARITZA BUNN SP              Unavailable     [phone]









Care Team Providers







                    Name                Role                Phone

 

                    BIANCA NÚÑEZ Attending Clinician Unavailable

 

                    Bianca Núñez MD Attending Clinician [phone]

0

 

                    [id number]               Attending Clinician Unavailable









Payers







           Payer Name Policy Type Policy Number Effective Date Expiration Date S

shlomo

 

           [facility]       2          [id number] 2022 00:00:00            







Problems

This patient has no known problems.



Allergies, Adverse Reactions, Alerts

This patient has no known allergies or adverse reactions.



Medications

This patient has no known medications.



Procedures

This patient has no known procedures.



Encounters







        Start   End     Encounter Admission Attending Care    Care    Encounter 

Source



        Date/Time Date/Time Type    Type    Clinicians Facility Department ID   

   

 

        2022 Outpatient         ASHA NÚÑEZ  [id number]

[address]



        00:00:00 00:00:00                 BIANCA wilson

 

        2022 Outpatient         ASHA NÚÑEZ  [id number]

[address]



        00:00:00 00:00:00                 BIANCA wilson

 

        2022 Outpatient         ASHA NÚÑEZ  [id number]

[address]



        00:00:00 00:00:00                 BIANCA wilson

 

        2022 Outpatient         ASHA NÚÑEZ  [id number]

[address]



        00:00:00 00:00:00                 BIANCA Amaayol

steve

 

        2022 Office          Akash Núñez    [id number]

[address]



        13:30:00 14:15:00 Visit           Bianca Wallace [id number]         Se

ybold



                                        Somogyi         [id number]         



                                                        [phone]         



                                                        0               

 

        2022 Outpatient         ASHA NÚÑEZ  [id number]

[address]



        00:00:00 00:00:00                 BIANCA                           Seybol

d

 

        2022 Outpatient         ASHA NÚÑEZ  [id number]

[address]



        00:00:00 00:00:00                 BIANCA                           Seybol

d

 

        2022 Office          Akash Núñez    [id number]

[address]



        16:15:00 16:45:00 Visit           Bianca Wallace [id number]         Se

ybjames



                                        Somogyi         [id number]         



                                                        [phone]         



                                                        0               

 

        2022 Outpatient         [facility]   ASHA BARRY  [id number]

[address]



        10:15:00 10:15:00                                                 Seybol

d

 

        2022 Office          Akash Núñez    [id number]

[address]



        09:30:00 10:00:00 Visit           Bianca Wallace [id number]         Se

ybjames



                                        Somogyi         [id number]         



                                                        [phone]         



                                                        0               







Results

This patient has no known results.

## 2022-08-29 NOTE — PN
Date of Progress Note:  08/29/2022



Chief Complaint:  Acute kidney injury.



History Of Present Illness:  The patient was admitted to the hospital and she was found to have acute
 kidney injury, which was due to Bactrim effects.  The patient also has fluid overload and she was ta
nicole diuretic.  At this point, she is off diuretic.  Renal function has stabilized and acute kidney i
njury resulted.



Review of Systems:

Denies fever or chills.



Physical Examination:

Lungs:  Clear to auscultation bilaterally. 

Heart:  S1, S2. 

Abdomen:  Soft, benign. 

Extremities:  No edema.



Impression/plan:  

1.Acute kidney injury, multifactorial secondary to bacteremia.  Renal function has improved.  Contin
ue adequate hydration, avoid nephrotoxic medication.  Patient cannot take nonsteroidal anti-inflammat
ory medication.

2.Hypertension.  Blood pressure is controlled and optimal.  Continue low-sodium diet and medication 
for blood pressure control as needed.

3.Edema.  Patient will continue low-sodium diet.  Avoid excessive diuretics.  Patient is allergic to
 sulfa medication and cannot take diuretic and she could not tolerate Ethacrynic acid.

4.Hyperkalemia, resolved.  Continue to monitor renal function.





EB/MODL

DD:  08/29/2022 17:03:45Voice ID:  [id number]

DT:  08/29/2022 20:27:37Report ID:  [id number]

## 2022-08-29 NOTE — PN
Subjective:  The patient lying in bed.  No new acute event.  Chart reviewed.  Denies any headache, na
usea, vomiting, chest pain, abdominal pain, constipation, or diarrhea.



Objective:  Vital Signs:  Temperature 97, pulse 65, respirations 18, blood pressure 125/50. 

General:  Examination unchanged. 

Skin:  No more rash noted on the body.



Laboratory Data:  Shows WBC 11.1, hemoglobin 12.5, platelets are 359.  Chemistry; sodium 137, potassi
um 4.6, chloride 109, bicarb 23, BUN 36, creatinine 0.8, glucose 132.



Assessment And Plan:  The patient is being discharged to rehab.  Drug rash secondary to sulfa drugs. 
 Leukemoid reaction is resolving.  The patient's white count is now 11,000.  Continue supportive care
.  Sweet syndrome, continue supportive care and symptomatic care.  We will follow the patient as need
ed.





NF/MODL

DD:  08/29/2022 12:24:51Voice ID:  [id number]

DT:  08/29/2022 12:47:11Report ID:  [id number]

## 2022-08-29 NOTE — P.DS
Admission Date: 08/13/22


Discharge Date: 08/29/22


Disposition: TRANSFER TO INPATIENT REHAB


Discharge Condition: GOOD


Reason for Admission: Sweet syndrome hypertension


Consultations: 


1. Nephrology


2. Cardiology


3. Infectious Diseases


4. General Surgery


Procedures: 


08/17/2022 - Skin Punch Biopsy of Left Thigh


Hospital Course: 








DIAGNOSES:


# Acute Febrile Neutrophilic Dermatosis (Sweet's Syndrome)


# Severe Sepsis likely secondary to Left Lower Extremity Cellulitis


# Acute Kidney Injury suspect due to Sepsis, resolved


# Suspect Type II NSTEMI (Demand Ischemia)


# Hypertension 


# Hyperlipidemia


# Deconditioning


# History of Guillan-Dillon Syndrome





HOSPITAL COURSE:


Ms. Herminia Amaro is a pleasant 57 year old female with a past medical history

significant for hypertension, hyperlipidemia, and Guillan-Dillon Syndrome who was

admitted to the [facility] on 08/13/2022 for a 

morbilliform rash.





She was admitted to the Medicine service for further evaluation. In addition to 

the morbilliform rash, she was found to be in severe sepsis secondary to a left 

lower extremity cellulitis. She was also found to have an acute kidney injury 

secondary to sepsis. She was treated with IV antibiotics per sepsis protocol, 

with improvement of these symptoms. An MRI of her foot was performed and 

revealed, "no evidence of osteomyelitis." In regards to her morbilliform rash, 

this was initially thought to represent a Bactrim-induced rash vs vasculitis. 

General Surgery was consulted and she underwent a skin punch biopsy. Her biopsy 

returned positive for Sweet's Syndrome. She was treated with steroids and 

discharged with enough prednisone to complete a 4 week course (end date: 

09/16/2022). She was also prescribed atovaquone for PJP prophylaxis while on 

prednisone as she is allergic to Bactrim and Dapsone (sulfa). Additionally, she 

developed an elevated troponin trend. Cardiology was consulted and her troponin 

trend was attributed to a type II NSTEMI (demand ischemia). Over her hospital 

course, her rash continued to improve with the steroid therapy. However, she had

significant deconditioning. Physical therapy was consulted and recommended 

placement into inpatient rehab. With the assistance of case management, she was 

accepted to [facility].





In regard's to her Sweet's Syndrome, she was advised that this condition is 

highly associated with malignancy. I have advised her to schedule a CT chest, 

mammogram, colonoscopy, Pap smear, and all age-related cancer screening with her

PCP once discharged from inpatient rehab. She verbalized understanding and 

agreed to make these appointments.





On 08/29/2022, she was seen on morning rounds and deemed medically stable for 

discharge. She was discharged with instructions to schedule follow-up 

appointments with her PCP 3-5 days following discharge from inpatient rehab. She

was given the opportunity to ask questions and reported no further questions. 

Furthermore, all questions were answered to the best of my ability.





Today, I personally spent 20 minutes on her case, of which greater than 50% of 

the time was spent in patient education, counseling, and coordination of care as

described above.








- Physical Exam


General: Alert, In no apparent distress, Oriented x3


HEENT: Atraumatic, PERRLA, Mucous membr. moist/pink, EOMI, Sclerae nonicteric


Neck: Supple, JVD not distended


Respiratory: Clear to auscultation bilaterally, Normal air movement


Cardiovascular: No edema, Regular rate/rhythm, Normal S1 S2, No gallops, No 

rubs, No murmurs


Gastrointestinal: Normal bowel sounds, Soft and benign, Non-distended, No 

tenderness, No rebound, No guarding


Musculoskeletal: No clubbing


Integumentary: Rash(es) (minimal to no erythema - rash has significantly 

improved and nearly resolved)


Neurological: Normal speech, Cranial nerves 3-12 intact, Abnormal speech





Vital Signs/Physical Exam: 














Temp Pulse Resp BP Pulse Ox


 


 97.6 F   65   18   125/50 L  95 


 


 08/29/22 08:00  08/29/22 08:00  08/29/22 08:00  08/29/22 08:00  08/29/22 08:00








Laboratory Data at Discharge: 














WBC  11.10 K/uL (4.3-10.9)  H  08/28/22  09:31    


 


Hgb  12.5 g/dL (12.0-15.0)   08/28/22  09:31    


 


Hct  37.2 % (36.0-45.0)   08/28/22  09:31    


 


Plt Count  359 K/uL (152-406)   08/28/22  09:31    


 


Sodium  137 mmol/L (136-145)   08/28/22  09:31    


 


Potassium  4.6 mmol/L (3.5-5.1)   08/28/22  09:31    


 


BUN  36 mg/dL (7-18)  H  08/28/22  09:31    


 


Creatinine  0.86 mg/dL (0.55-1.3)   08/28/22  09:31    


 


Glucose  132 mg/dL ()  H  08/28/22  09:31    


 


Magnesium  2.3 mg/dL (1.8-2.4)   08/25/22  05:19    


 


Total Bilirubin  0.4 mg/dL (0.2-1.0)   08/22/22  05:27    


 


AST  30 U/L (15-37)   08/22/22  05:27    


 


ALT  58 U/L (12-78)   08/22/22  05:27    


 


Alkaline Phosphatase  78 U/L ()   08/22/22  05:27    


 


Triglycerides  162 mg/dL (<150)  H  08/13/22  03:12    


 


Cholesterol  149 mg/dL (<200)   08/13/22  03:12    


 


HDL Cholesterol  31 mg/dL (40-60)  L  08/13/22  03:12    


 


Cholesterol/HDL Ratio  4.81   08/13/22  03:12    








Home Medications: 








Famotidine 20 mg PO BID 08/13/22 


Albuterol Inhaler [Ventolin Inhaler*] 1 puff IH Q6H PRN 08/29/22 


Amlodipine [Norvasc*] 10 mg PO DAILY  tab 08/29/22 


Atovaquone 10 ml PO LUNCH 19 Days 08/29/22 


Hydrocortisone Oint [Cortizone 1% Ointment*] 1 appl TOP TID PRN  tube 08/29/22 


Montelukast [Singulair*] 10 mg PO BEDTIME  tab 08/29/22 


Rivaroxaban [Xarelto*] 10 mg PO DAILY AT SUPPER 08/29/22 


Valsartan [Diovan*] 160 mg PO BID  tab 08/29/22 


carvediloL [Coreg*] 12.5 mg PO BID 6AM 6PM  tab 08/29/22 


predniSONE [Deltasone*] 10 mg PO BID 19 Days  tab 08/29/22 





New Medications: 


Atovaquone 10 ml PO LUNCH 19 Days


predniSONE [Deltasone*] 10 mg PO BID 19 Days  tab


Physician Discharge Instructions: 


Transfer to inpatient rehab


Diet: AHA


Activity: Fall precautions


Followup: 


Unknown,U [Primary Care Provider] - 


Time spent managing pt's care (in minutes): 20

## 2022-08-29 NOTE — R.PREADM
PRE-ADMISSION SCREENING FORM



SCREENING DATE AND TIME



2022 09:11 (CDT) 



ANTICIPATED REHAB ADMISSION DATE



2022 



REFERRING FACILITY



[facility] 



REFERRAL DATE AND TIME



2022 09:11 (CDT) 



ACUTE ADMIT DATE



2022 





Previous Rehabilitation(s):





No.



REFERRING PHYSICIAN



Saji Li 



REHAB FACILITY



[facility] 



CLINICAL LIAISON



Jackelin Martinez 



PHYSICIAN REVIEWER



Dr. Dev Feliciano M.D. 



MR#



[id number] 



[facility]



[id number] 



NAME



NICOLE BUNN



ADDRESS



[address] 



PHONE



[phone] 



ZIP



[id number] 



DATE OF BIRTH



1965 



AGE



57 



SSN#



[id number] 



GENDER



female 



MARITAL STATUS



 







unknown race 



ADMIT FROM



02 - [facility] 



PRE-HOSPITAL LIVING SETTING



01 - Home (private home/apt. board/care, assisted living, group home, transitional living) 



HOME TYPE AND DETAILS



Type of home: single family house

# of levels in the residence: 1

# of steps to enter the residence: 0

# of steps within the residence: 0



PRE-HOSPITAL LIVING WITH



Family/Relatives 



FAMILY SUPPORT



Yes 



PHONE PRIMARY FAMILY CONTACT ON ADM.?



no 



IS PRIMARY FAMILY CONTACT AUTH. REP.?



no 



PHONE 1ST CONTACT ON ADM.



no 



IS 1ST CONTACT AUTH. REP.?



no 



PHONE 2ND CONTACT ON ADM.?



no 



PATIENT EMPLOYMENT STATUS



Employed Full Time 



PAYOR INFORMATION:



1ST PAYOR NAME



[employer] 



1ST PAYOR POLICY ID



[id number] 



INJURY/ILLNESS DUE TO ACCIDENT?



No 



ANOTHER PARTY RESPONSIBLE?



No 



PRIMARY REHAB/ACUTE DIAGNOSIS:



Deconditioning



ONSET DATE



2022



REHAB IMPAIRMENT CATEGORY (BIPIN):



20 Miscellaneous (Misc) does NOT meet 60% rule



PRIMARY DIAGNOSIS-RELATED SURGERIES:



None



COMORBID REHAB/ACUTE DIAGNOSES:



- Non-Tiered

Essential (primary) hypertension (I10) 

Edema, unspecified (R60.9) 

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 



INTERVENTIONS:



- Hypertension

Assess/ Monitor patient B/P and treat with prescribed medications

Implement healthy diet

Increase physical activity

- LE Edema

Monitor respiratory/cardiac status

PT

- Sweet Syndrome

Assess/Monitor pt labs

Administer prescribed medications and monitor for effectiveness



RISK FOR COMPLICATIONS:



- DVT

Active and Passive ROM exercises

Administer medications per MD order

Assist patient with frequent position changes

Elevate BLE

- Skin Breakdown

Encourage ambulation as tolerated

Repositioning q 2 hours

Use of pillows or foam wedges while in bed

- Pain

Administer prescribed pain medication as needed

Anticipate the need for pain medication for optimal pain managment

Assess pt for pain and Administer prescribed pain medication as needed

Assist patient with frequent position changes at least every 2 hours

Educate patient on relaxation and deep breathing techniques

- Falls

Maintain call light within patient reach for easy access to nursing assistance

Provide assistance getting out of bed and with ambulation

Provide assistive devices



SUMMARY OF ACUTE HOSPITALIZATION:



Pt. is a 56 yo female of unknown race.

On 2022 she was admitted to [facility] with diagnosis Deconditioning.

Her impairment category is Debility 16 -  Debility (16).

Pre-morbidly, Pt. was independent/mod-I in Locomotion and Self-Care; and she had good Safety Awarenes
s, Balance, Transfers Control, and Self-Care.

Currently, she has deficits of Locomotion, Safety Awareness, Balance, Transfers Control, Self-Care, a
nd Endurance.

Pt. is now referred to [facility] for acute in-patient rehabilitation in order
 to maximize patient's functional independence in activities of daily living, strength, ROM, and mobi
lity.

Patient has realistic goal of being discharged at assistance level 6-Nara to reside at Home with  Fam
michell/Relatives.





PAST MEDICAL HISTORY



Edema, unspecified (R60.9) 

Essential (primary) hypertension (I10) 

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 

Hyperlipidemia, unspecified (E78.5)

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

Carpal tunnel syndrome (G56.0)



PAST SURGICAL HISTORY:



Left foot surgery

carpal tunnel release



MEDICATION ALLERGIES:



No Known Drug Allergies (NKDA)



ENVIRONMENTAL ALLERGIES:



- Substance Allergies

None Known

- Other Allergies

None Known



CODE STATUS:



Full code



WEIGHT/HEIGHT/BMI:



WEIGHT



285 lbs 



HEIGHT



5' 6" 



BMI



46 



DIET:



- Diet Type

Regular

- Diet - Solid Texture

Regular

- Diet - Liquid Texture

Regular

- Tube Feed

N/A







None



REVIEW OF SYSTEMS:



- Gen

Alert and awake

Lying in bed

No apparent distress

Oriented to: person, time, and place

- Vital Signs

Temperature: 98.4 F

SBP/DBP: 161/72

Pulse: 67

Resp: 14

Vital signs stable, afebrile

- CVS

RRR



VITAL SIGNS



Temperature: 98.4 F

SBP/DBP: 161/72

Pulse: 67

Resp: 14

Vital signs stable, afebrile



MEDICATIONS/TREATMENT:



Other-  See attached MAR (Medication Administration Record).



CURRENT SPHINCTER CONTROL:



Pre-hospital bladder status: unspecified

# of bladder accidents in the last 7 days prior to screenin

Pre-hospital bowel status: unspecified

# of bowel accidents in the last 7 days prior to screenin

Last Bowel Movement Date: 2022



CURRENT LOCOMOTION STATUS:



distance walked 0  feet



DETAILED CURRENT FUNCTIONAL STATUS:



- Bladder

accident frequency: 7-Ind - No accidents in the past 7 days

- Bowel

accident frequency: 7-Ind - No accidents in the past 7 days

- Walking

score based on distance walked: 0(N/A)

- Wheelchair

score based on distance traveled: 0(N/A)



QI SCORES:



- Self-Care

A. Eating  04-Supervision or touching assistance  

B. Oral hygiene  04-Supervision or touching assistance  

C. Toileting hygiene  02-Substantial/maximal assistance  

E. Shower/bathe self  02-Substantial/maximal assistance  

F. Upper body dressing  03-Partial/moderate assistance  

G. Lower body dressing  02-Substantial/maximal assistance  

H. Putting on/taking off footwear  02-Substantial/maximal assistance  

- Mobility

A. Roll left and right  04-Supervision or touching assistance  

B. Sit to lying  04-Supervision or touching assistance  

C. Lying to sitting on side of bed  04-Supervision or touching assistance  

D. Sit to stand  02-Substantial/maximal assistance  

E. Chair/bed-to-chair transfer  02-Substantial/maximal assistance  

F. Toilet transfer  02-Substantial/maximal assistance  

G. Car transfer  88-Not attempted due to medical condition or safety concerns  

I. Walk 10 feet  88-Not attempted due to medical condition or safety concerns  

J. Walk 50 feet with two turns  88-Not attempted due to medical condition or safety concerns  

K. Walk 150 feet  88-Not attempted due to medical condition or safety concerns  

L. Walking 10 feet on uneven surfaces  88-Not attempted due to medical condition or safety concerns  


M. 1 step (curb)  88-Not attempted due to medical condition or safety concerns  

N. 4 steps  88-Not attempted due to medical condition or safety concerns  

O. 12 steps  88-Not attempted due to medical condition or safety concerns  

P. Picking up object  88-Not attempted due to medical condition or safety concerns  

R. Wheel 50 feet with two turns  09-Not applicable  

S. Wheel 150 feet  09-Not applicable  

- Bladder and Bowel

Bladder continence  0-Always continent  

Bowel continence  0-Always continent  

- Endurance

Poor

- Balance

Poor

- Safety Awareness

Fair



CURRENT FUNC. DEFICITS:



Self-Care, Mobility, Endurance, Balance, and Safety Awareness







THERAPY NOTES FROM ACUTE CARE:



Attached.



SPECIAL NEEDS:



- Safety Concerns

Skin breakdown precautions needed due to skin breakdown risk



PATIENT NEEDS ACTIVE AND ONGOING THERAPEUTIC INTERVENTION OF MULTIPLE THERAPY DISCIPLINES, INCLUDING:




- Dietary and Nutrition

Adequate Nutrition. Nutritional Education. Nutritional Supplements. 



- Occupational Therapy

Cognitive Retraining. Patient needs Occupational Therapy for a daily minimum of 1.5 hours at least 5 
out of 7 days, to improve Activities of Daily Living, including: Eating, Grooming, Bathing, Dressing,
 Toileting, Toilet Transfers, Community Reintegration, Higher functional activities, Adaptive Equipme
nt, Splinting, Household Tasks, and Other activities as determined. Visual Perceptual Training. 



- Physical Therapy

Patient needs Physical Therapy for a daily minimum of 1.5 hours at least 5 out of 7 days, to improve:
 Mobility, Strengthening, Transfers, Stretching, ROM, Endurance, Ability to manage stairs, Gait, and 
Balance. 



PATIENT NEEDS CLOSE MEDICAL SUPERVISION BY A REHABILITATION PHYSICIAN FOR:



Coordination of Treatment Team

Medical and Co-Morbidity Management

Wound Care

Bowel and Bladder Management

Pain Management



PATIENT REQUIRES 24X7 REHAB NURSING FOR MEDICAL AND FUNCTIONAL MGT. OF THE FOLLOWING DEFICITS:



Disease Management

Medication Management

Patient requires 24x7 Rehabilitation Nursing for: Pain Issues, Identifying and preventing risk factor
s, Monitoring and reporting current medical conditions, Assisting with ambulation and transfer, Jing
ting with all ADL-s, Teaching patients about disease process and medications, Family teaching, Provid
ing safe environment, Bowel and Bladder Issues, Skin Integrity, and Medication Management

Patient/Family Education

Providing Safe Environment

Skin Integrity



PATIENT REQUIRES INTENSIVE, COORDINATED INTERDISCIPLINARY APPROACH TO REHAB:



Arranging Home Equipment/Services

Discharge Planning

Family Intervention/Training

Patient needs Dietary and Nutrition Services for: Adequate Nutrition, Nutritional Supplements, and Nu
tritional Education

Patient needs  and/or Case Management for: Discharge Planning, Arranging Home Equipmen
t or Services, and Family Interventions

/Case Management



PATIENT REHAB POTENTIAL:



CHARITY BUNN is able and expected to receive 3 hours of individualized therapy daily on at least 5 of e
very 7 days

CHARITY BUNN's prognosis for significant practical improvement within a reasonable period of time appea
rs Good

Expected level of measurable improvement will be of a practical value to CHARITY BUNN's functional capa
city or adaptations to impairments

Has a viable Discharge Plan

Medically appropriate; condition is sufficiently stable to participate in intensive rehab program



DISCHARGE PLAN:



- Estimated Length of Stay (days)

13. 



- Consensus on plan

Discharge plan has been discussed with primary caregiver. Patient/Family is in agreement with the ami
n. Primary caregiver is in agreement with the plan. 



- Patient/Family Goals

Return home independently. 



- Planned Living Setting Upon Discharge

Home, to live with Family/Relatives. Transitional Living. 



RECOMMENDED CARE LEVEL:



IRF



RECOMMENDATION DETAILS:



Recommended Admission to Comprehensive Rehabilitation Program to Increase Functional [place]



SCREENER'S COMPLETENESS CONFIRMATION:



- Screening Confirmation

The patient data collection on this preadmission screening form is finished



PHYSICIANS REVIEW AND ADMISSION DETERMINATION



Admit - Based on my review of the Pre-Admission Screening results, in my medical judgment and experie
nce, I concur with the findings and recommend admission to [facility], as this
 patient requires an IRF level of care.



SIGNATURE PANEL:



Clinical Liaison - [electronically] signed by Jackelin Martinez on 2022 at 10:36 (CDT)

Physician Reviewer - [electronically] signed by Dr. Dev Feliciano M.D. on 2022 at 10:40 (CDT
)

## 2022-08-30 LAB
ALBUMIN SERPL BCP-MCNC: 2.7 G/DL (ref 3.4–5)
BUN BLD-MCNC: 40 MG/DL (ref 7–18)
GLUCOSE SERPLBLD-MCNC: 160 MG/DL (ref 74–106)
HCT VFR BLD CALC: 36.3 % (ref 36–45)
LYMPHOCYTES # SPEC AUTO: 0.9 K/UL (ref 0.7–4.9)
MAGNESIUM SERPL-MCNC: 2.4 MG/DL (ref 1.8–2.4)
MCV RBC: 91.2 FL (ref 80–100)
PMV BLD: 8.1 FL (ref 7.6–11.3)
POTASSIUM SERPL-SCNC: 5.3 MMOL/L (ref 3.5–5.1)
PREALB SERPL-MCNC: 36.1 MG/DL (ref 20–40)
RBC # BLD: 3.98 M/UL (ref 3.86–4.86)

## 2022-08-30 RX ADMIN — VALSARTAN SCH: 160 TABLET ORAL at 08:00

## 2022-08-30 RX ADMIN — ACETAMINOPHEN PRN MG: 500 TABLET, FILM COATED ORAL at 20:09

## 2022-08-30 RX ADMIN — LACTOBACILLUS TAB SCH TAB: TAB at 09:17

## 2022-08-30 RX ADMIN — Medication SCH: at 12:00

## 2022-08-30 RX ADMIN — FAMOTIDINE SCH MG: 20 TABLET, FILM COATED ORAL at 09:19

## 2022-08-30 RX ADMIN — DIPHENHYDRAMINE HYDROCHLORIDE PRN MG: 25 CAPSULE ORAL at 20:08

## 2022-08-30 RX ADMIN — RIVAROXABAN SCH MG: 10 TABLET, FILM COATED ORAL at 17:27

## 2022-08-30 RX ADMIN — MAGNESIUM OXIDE TAB 400 MG (241.3 MG ELEMENTAL MG) SCH MG: 400 (241.3 MG) TAB at 09:18

## 2022-08-30 RX ADMIN — Medication SCH MG: at 19:58

## 2022-08-30 RX ADMIN — ASPIRIN SCH MG: 81 TABLET, COATED ORAL at 09:18

## 2022-08-30 RX ADMIN — VALSARTAN SCH MG: 160 TABLET ORAL at 19:58

## 2022-08-30 RX ADMIN — Medication SCH MG: at 09:17

## 2022-08-30 RX ADMIN — FAMOTIDINE SCH MG: 20 TABLET, FILM COATED ORAL at 19:58

## 2022-08-30 RX ADMIN — MONTELUKAST SODIUM SCH MG: 10 TABLET, FILM COATED ORAL at 19:57

## 2022-08-30 RX ADMIN — ACETAMINOPHEN PRN MG: 500 TABLET, FILM COATED ORAL at 11:04

## 2022-08-30 NOTE — R.HP
HISTORY AND PHYSICAL



FACILITY:



[facility]



ENCOUNTER DATE AND TIME:



08/30/2022 14:26 (CDT)



MR#:



[id number]



ACCT#:



[id number]



NAME



NICOLE BUNN



ADDRESS:



[address] [place] [address]:



[place]



ZIP



[id number]



PHONE:



[phone]



YOB: 1965



AGE:



57



SSN#



[id number]



GENDER:



Female



DEXTERITY



Unknown dexterity



MARITAL STATUS











Unknown race



PRE-HOSPITAL LIVING SETTING



01 - Home (private home/apt. board/care, assisted living, group home, transitional living) 



PRE-HOSPITAL LIVING WITH



Family/Relatives 



ENCOUNTER PHYSICIAN:



Dr. Dev Feliciano M.D.



REFERRING DOCTOR:



benjamín Li



DATE OF ADMISSION:



08/29/2022 12:59 (CDT)



REFERRING FACILITY



[facility]ending [facility] to [facility] 



HOME TYPE AND DETAILS:



Type of home: single family house

# of levels in the residence: 1

# of steps to enter the residence: 0

# of steps within the residence: 0



ONSET DATE:



08/13/2022



PRIMARY DIAGNOSIS-RELATED SURGERIES:



None



SECONDARY/COMORBID DIAGNOSES (TIERED):



- Non-Tiered

Essential (primary) hypertension (I10) 

Edema, unspecified (R60.9) 

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 



HISTORY OF PRESENT ILLNESS (HPI):



Pt. is a 58 yo female of unknown race.

On 08/13/2022 she was admitted to [facility] to [facility] with diagnosis Deconditioning.

Her impairment category is Debility 16 -  Debility (16).

Pre-morbidly, Pt. was independent/mod-I in Locomotion and Self-Care; and she had good Safety Awarenes
s, Balance, Transfers Control, and Self-Care.

Currently, she has deficits of Locomotion, Safety Awareness, Balance, Transfers Control, Self-Care, a
nd Endurance.

Pt. is now referred to [facility] for acute in-patient rehabilitation in order
 to maximize patient's functional independence in activities of daily living, strength, ROM, and mobi
lity.

Patient has realistic goal of being discharged at assistance level 6-Nara to reside at Home with  Fam
michell/Relatives.





MEDICATION ALLERGIES:



No Known Drug Allergies (NKDA)



ENVIRONMENTAL ALLERGIES:



- Substance Allergies

None Known

- Other Allergies

None Known



PAST MEDICAL HISTORY:



Edema, unspecified (R60.9) 

Essential (primary) hypertension (I10) 

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 

Hyperlipidemia, unspecified (E78.5)

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

Carpal tunnel syndrome (G56.0)



PAST SURGICAL HISTORY:



Left foot surgery

carpal tunnel release



SOCIAL HISTORY:



- Home Living

Family/Relatives



REVIEW OF SYSTEMS:



- Gen

No Chills

Fatigue

No Fever

- Eyes

No Double Vision

No itchiness

- ENMT

No Difficulty Swallowing

- CVS

Chest Discomfort

No Chest Pain

Fatigue

No Weight Gain

- Resp

No Cough

No Shortness of Breath

- GI

Continent

No Abdominal Pain

No Constipation

No Diarrhea

- 

Continent

No Kidney Pain

No Painful Urination

No Urinary Urgency

- MSK

No Joint Pain

No Muscle Cramps

Stiffness

- Skin

No Itching

No Rash

No Suspicious Lesions

- Neuro

Coordination Difficulty

No Difficulty with Concentration

No Memory Loss

No Seizures

Weakness

- Psych

No Anxiety

No Depression



No HIV Exposure

No Persistent Infections

No Seasonal Allergies

- Endo

No Cold/Heat Intolerance

No Excessive Hunger

No Excessive Thirst

No Excessive Urination



PHYSICAL EXAM



- Gen

Alert and awake

Lying in bed

No apparent distress

Oriented to: person, time, and place

- Skin

No skin breakdown.



No abnormalities

- Eyes

No abnormalities

- ENMT

No abnormalities

- Neck

No abnormalities

- CVS

RRR

- Chest

No abnormalities

- Abd

Obese, soft, nontender

- GI

Non distended



Deferred

- 

No abnormalities

- Ext

Mild bilateral lower extremity edema.

- MSK

4+/5 weakness in both lower extremities.

- Neuro

No focal deficits



VITAL SIGNS



Temperature: 97.8 F

SBP/DBP: 139/62

Pulse: 64

Resp: 16



NURSING:



- Shower

allowing shower



ACTIVITIES



OOB only with supervision



QI SCORES:



- Self-Care

A. Eating  04-Supervision or touching assistance  

B. Oral hygiene  04-Supervision or touching assistance  

C. Toileting hygiene  02-Substantial/maximal assistance  

E. Shower/bathe self  02-Substantial/maximal assistance  

F. Upper body dressing  03-Partial/moderate assistance  

G. Lower body dressing  02-Substantial/maximal assistance  

H. Putting on/taking off footwear  02-Substantial/maximal assistance  

- Mobility

A. Roll left and right  04-Supervision or touching assistance  

B. Sit to lying  04-Supervision or touching assistance  

C. Lying to sitting on side of bed  04-Supervision or touching assistance  

D. Sit to stand  02-Substantial/maximal assistance  

E. Chair/bed-to-chair transfer  02-Substantial/maximal assistance  

F. Toilet transfer  02-Substantial/maximal assistance  

G. Car transfer  88-Not attempted due to medical condition or safety concerns  

I. Walk 10 feet  88-Not attempted due to medical condition or safety concerns  

J. Walk 50 feet with two turns  88-Not attempted due to medical condition or safety concerns  

K. Walk 150 feet  88-Not attempted due to medical condition or safety concerns  

L. Walking 10 feet on uneven surfaces  88-Not attempted due to medical condition or safety concerns  


M. 1 step (curb)  88-Not attempted due to medical condition or safety concerns  

N. 4 steps  88-Not attempted due to medical condition or safety concerns  

O. 12 steps  88-Not attempted due to medical condition or safety concerns  

P. Picking up object  88-Not attempted due to medical condition or safety concerns  

R. Wheel 50 feet with two turns  09-Not applicable  

S. Wheel 150 feet  09-Not applicable  

- Bladder and Bowel

Bladder continence  0-Always continent  

Bowel continence  0-Always continent  

- Endurance

Poor

- Balance

Poor

- Safety Awareness

Fair



CURRENT FUNC. DEFICITS:



Self-Care, Mobility, Endurance, Balance, and Safety Awareness



MEDICATIONS:



- Other

See attached MAR (Medication Administration Record)



ASSESSMENT:



Pt. is a 58 yo female of unknown race.On 08/13/2022 she was admitted to [facility] with di
agnosis Deconditioning.Her impairment category is Debility 16 -  Debility (16).Pre-morbidly, Pt. was 
independent/mod-I in Locomotion and Self-Care; and she had good Safety Awareness, Balance, Transfers 
Control, and Self-Care.Currently, she has deficits of Locomotion, Safety Awareness, Balance, Transfer
s Control, Self-Care, and Endurance.Pt. is now referred to [facility] for acut
e in-patient rehabilitation in order to maximize patient's functional independence in activities of d
aily living, strength, ROM, and mobility.- Rehab Goal

Patient has realistic goal of being discharged at assistance level 6-Nara to reside at Home with  Fam
michell/Relatives.





- Physical Therapy

Gait dysfunction - to improve, our physical therapists will perform initial evaluation of pt's status
 upon admission and devise an individualized program for Gait Training, and Wheel Chair mobility

Inability to transfer - to improve, our physical therapists will perform initial evaluation of pt's s
tatus upon admission and devise an individualized program for Bed mobility

Need for home safety evaluation - to improve, our physical therapists will perform initial evaluation
 of pt's status upon admission and devise an individualized program for Home Evaluation

Need in caregiver upon discharge - to improve, our physical therapists will perform initial evaluatio
n of pt's status upon admission and devise an individualized program for Caregiver Training

New precaution - to improve, our physical therapists will perform initial evaluation of pt's status u
destin admission and devise an individualized program for Patient precaution education

 Edema - to improve, our physical therapists will perform initial evaluation of pt's status upon admi
ssion and devise an individualized program for Elevation Training, and Lymphedema Therapy

Poor balance - to improve, our physical therapists will perform initial evaluation of pt's status upo
n admission and devise an individualized program for Balance Training

Poor endurance - to improve, our physical therapists will perform initial evaluation of pt's status u
destin admission and devise an individualized program for Endurance Training

Weakness - to improve, our physical therapists will perform initial evaluation of pt's status upon ad
mission and devise an individualized program for Aquatic Therapy, Neuromuscular Reeducation, and Stre
ngthening

 Achieving independence - to improve, our physical therapists will perform initial evaluation of pt's
 status upon admission and devise an individualized program for Community Reintegration Activities

- Occupational Therapy

ADL deficits - to improve, our occupation therapists will perform initial evaluation of pt's status u
destin admission and devise an individualized program for Bathing, Bed mobility, Community Reintegration
, Cooking, Dressing, Eating, Fine Motor Skills, Grooming, Homemaking, Kitchen Mobility, Laundry, Charline
ent Education, Safety Awareness, Splinting - Positioning, Transfers(Toilet, Tub, Shower), and Wheel C
hair Management

Need for care giver - to improve, our occupation therapists will perform initial evaluation of pt's s
tatus upon admission and devise an individualized program for Caregiver Training

Weakness - to improve, our occupation therapists will perform initial evaluation of pt's status upon 
admission and devise an individualized program for Aquatic Therapy, Balance, Endurance, UE ROM, and U
E strengthening



MEDICAL PLAN:



- Diet Type

Start Regular

- Diet - Liquid Texture

Start Regular

- Tube Feed

Start N/A

- Other

See attached MAR (Medication Administration Record)

- Diet - Solid Texture

Regular

- Shower

 shower



DISCHARGE PLAN:



- Estimated Length of Stay (days)

13. 



- Consensus on plan

Discharge plan has been discussed with primary caregiver. Patient/Family is in agreement with the ami
n. Primary caregiver is in agreement with the plan. 



- Patient/Family Goals

Return home independently. 



- Planned Living Setting Upon Discharge

Home, to live with Family/Relatives. Transitional Living. 



SIGNATURE PANEL:



Electronically signed by Dr. Dev Feliciano M.D. on 08/30/2022 at 16:44 (CDT)

## 2022-08-30 NOTE — PAPE
POST ADMISSION PHYSICIAN EVALUATION



PATIENT:



[facility] 



MR#



[id number] 



ACCT#



[id number] 



REFERRING DOCTOR



benjamín Li



EVALUATION DATE AND TIME



08/30/2022 16:35 (CDT) 



NAME



NICOLE BUNN



DATE OF BIRTH



1965 



AGE



57 



PHONE



[phone] 



N#



[id number] 



GENDER



female 



EVALUATING PHYSICIAN



Dr. Dev Feliciano M.D. 



ADMISSION DIAGNOSIS:



Deconditioning



ONSET DATE



08/13/2022



SECONDARY/COMORBID DIAGNOSES TIERED:



- Non-Tiered

Essential (primary) hypertension (I10) 

Edema, unspecified (R60.9) 

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 



POST-ADMISSION FUNCTIONAL/MEDICAL STATUS:



- Bladder

Same accident frequency: 7-Ind - No accidents in the past 7 days

- Bowel

Same accident frequency: 7-Ind - No accidents in the past 7 days

- Walking

Same score based on distance walked: 0(N/A)

- Wheelchair

Same score based on distance traveled: 0(N/A)



STATUS CHANGE EVALUATION:



No change in Functional or Medical Status is identified compared with Pre-Admission screening.



PATIENT NEEDS CLOSE MEDICAL SUPERVISION BY A REHABILITATION PHYSICIAN FOR:



Coordination of Treatment Team

Medical and Co-Morbidity Management

Wound Care

Bowel and Bladder Management

Pain Management



PATIENT REQUIRES 24X7 REHAB NURSING FOR MEDICAL AND FUNCTIONAL MGT. OF THE FOLLOWING DEFICITS:



Disease Management

Medication Management

Patient requires 24x7 Rehabilitation Nursing for: Pain Issues, Identifying and preventing risk factor
s, Monitoring and reporting current medical conditions, Assisting with ambulation and transfer, Jing
ting with all ADL-s, Teaching patients about disease process and medications, Family teaching, Provid
ing safe environment, Bowel and Bladder Issues, Skin Integrity, and Medication Management

Patient/Family Education

Providing Safe Environment

Skin Integrity



PATIENT REQUIRES INTENSIVE, COORDINATED INTERDISCIPLINARY APPROACH TO REHAB:



Arranging Home Equipment/Services

Discharge Planning

Family Intervention/Training

Patient needs Dietary and Nutrition Services for: Adequate Nutrition, Nutritional Supplements, and Nu
tritional Education

Patient needs  and/or Case Management for: Discharge Planning, Arranging Home Equipmen
t or Services, and Family Interventions

/Case Management



LIST OF IDENTIFIED AND POTENTIAL PROBLEMS:



Alteration in leisure activities

Bladder, Incontinence

Blood Pressure, Hypertension/hypotension Issues

Bowel, Incontinence

Infection, Actual or Potential

Mobility Impaired

Pain, Alteration in Comfort

Self Care Deficit

Skin Integrity, Actual or Potential

Urinary Tract Infection (UTI), Actual or Potential



RISK FOR COMPLICATIONS



- DVT

Active and Passive ROM exercises. Administer medications per MD order. Assist patient with frequent p
osition changes. Elevate BLE. 



- Skin Breakdown

Encourage ambulation as tolerated. Repositioning q 2 hours. Use of pillows or foam wedges while in be
d. 



- Pain

Administer prescribed pain medication as needed. Anticipate the need for pain medication for optimal 
pain managment. Assess pt for pain and Administer prescribed pain medication as needed. Assist patien
t with frequent position changes at least every 2 hours. Educate patient on relaxation and deep breat
deb techniques. 



- Falls

Maintain call light within patient reach for easy access to nursing assistance. Provide assistance ge
tting out of bed and with ambulation. Provide assistive devices. 



INTERVENTIONS



- Hypertension

Assess/ Monitor patient B/P and treat with prescribed medications. Implement healthy diet. Increase p
hysical activity. 



- LE Edema

Monitor respiratory/cardiac status. PT. 



- Sweet Syndrome

Assess/Monitor pt labs. Administer prescribed medications and monitor for effectiveness. 



PATIENT COULD BE AT RISK FOR COMPLICATIONS FROM ADVERSE MEDICAL CONDITIONS DUE TO HIS/HER COMORBIDITI
ES AND THE RIGORS OF THE INTENSIVE REHABILLITATION PROGRAM. METHODS OR INTERVENTIONS TO AVOID COMPLIC
ATIONS INCLUDE:



- Bleeding

Assess lab values and manage abnormalities. Nursing to teach precautions for anti-coagulation therapy
. Wound to be assessed every shift. 



- Infection

Clinical staff to assess and manage the signs and symptoms of infection including fever, redness, war
mth, etc. 



- Urinary Tract Infection





- Falls

Patient will be evaluated for Fall Precautions and will be placed on Fall Precautions as indicated pe
r protocol. 



- Skin Breakdown

Nursing will assess skin daily using assessment tool and will place on Skin Breakdown Precautions as 
indicated per protocol. 



- Pain

Clinical staff may employ non-medication methods such as massage, distraction, decrease stimulus, etc
. as needed. Clinical staff will assess patient's pain level every shift per protocol to assess and e
nsure pain management effectiveness. Medications will be given and the pain level re-assessed. 



PRELIMINARY PLAN OF CARE:



- Physical Therapy

Patient needs Physical Therapy for a daily minimum of 1.5 hours at least 5 out of 7 days, to improve:
 Mobility, Strengthening, Transfers, Stretching, ROM, Endurance, Ability to manage stairs, Gait, and 
Balance. 



- Rehabilitation Nursing

Patient requires 24x7 Rehabilitation Nursing for: Pain Issues, Identifying and preventing risk factor
s, Monitoring and reporting current medical conditions, Assisting with ambulation and transfer, Jing
ting with all ADL-s, Teaching patients about disease process and medications, Family teaching, Provid
ing safe environment, Bowel and Bladder Issues, Skin Integrity, and Medication Management. 





Patient needs  and/or Case Management for: Discharge Planning, Arranging Home Equipmen
t or Services, and Family Interventions. 



- Dietary and Nutrition Services

Patient needs Dietary and Nutrition Services for: Adequate Nutrition, Nutritional Supplements, and Nu
tritional Education. 



- Occupational Therapy

Patient needs Occupational Therapy for a daily minimum of 1.5 hours at least 5 out of 7 days, to impr
ove Activities of Daily Living, including: Eating, Grooming, Bathing, Dressing, Toileting, Toilet Tra
nsfers, Community Reintegration, Higher functional activities, Adaptive Equipment, Splinting, Househo
ld Tasks, and Other activities as determined. 



QI SCORES:



- Self-Care

A. Eating  04-Supervision or touching assistance  

B. Oral hygiene  04-Supervision or touching assistance  

C. Toileting hygiene  02-Substantial/maximal assistance  

E. Shower/bathe self  02-Substantial/maximal assistance  

F. Upper body dressing  03-Partial/moderate assistance  

G. Lower body dressing  02-Substantial/maximal assistance  

H. Putting on/taking off footwear  02-Substantial/maximal assistance  

- Mobility

A. Roll left and right  04-Supervision or touching assistance  

B. Sit to lying  04-Supervision or touching assistance  

C. Lying to sitting on side of bed  04-Supervision or touching assistance  

D. Sit to stand  02-Substantial/maximal assistance  

E. Chair/bed-to-chair transfer  02-Substantial/maximal assistance  

F. Toilet transfer  02-Substantial/maximal assistance  

G. Car transfer  88-Not attempted due to medical condition or safety concerns  

I. Walk 10 feet  88-Not attempted due to medical condition or safety concerns  

J. Walk 50 feet with two turns  88-Not attempted due to medical condition or safety concerns  

K. Walk 150 feet  88-Not attempted due to medical condition or safety concerns  

L. Walking 10 feet on uneven surfaces  88-Not attempted due to medical condition or safety concerns  


M. 1 step (curb)  88-Not attempted due to medical condition or safety concerns  

N. 4 steps  88-Not attempted due to medical condition or safety concerns  

O. 12 steps  88-Not attempted due to medical condition or safety concerns  

P. Picking up object  88-Not attempted due to medical condition or safety concerns  

R. Wheel 50 feet with two turns  09-Not applicable  

S. Wheel 150 feet  09-Not applicable  

- Bladder and Bowel

Bladder continence  0-Always continent  

Bowel continence  0-Always continent  

- Endurance

Poor

- Balance

Poor

- Safety Awareness

Fair



POTENTIAL FUNCTIONAL GOALS FOR PATIENT TO ACHIEVE BY DISCHARGE:



- Safety Precaution

Patient will remain free from falls or injury at time of discharge. 



- Bed Mobility

Patient will perform bed mobility at 4-Maureen level of assistance. 



- Transfers

Patient will complete transfers from bed to chair at 4-Maureen level of assistance. 



- Mobility

Patient will ambulate 150 ft with 4-Maureen level of assistance with RW. 



PATIENT REHAB POTENTIAL



CHARITY BUNN is able and expected to receive 3 hours of individualized therapy daily on at least 5 of e
very 7 days

CHARITY BUNN's prognosis for significant practical improvement within a reasonable period of time appea
rs Good

Expected level of measurable improvement will be of a practical value to CHARITY BUNN's functional capa
city or adaptations to impairments

Has a viable Discharge Plan

Medically appropriate; condition is sufficiently stable to participate in intensive rehab program



DISCHARGE PLAN:



- Estimated Length of Stay (days)

13. 



- Consensus on plan

Discharge plan has been discussed with primary caregiver. Patient/Family is in agreement with the ami
n. Primary caregiver is in agreement with the plan. 



- Patient/Family Goals

Return home independently. 



- Planned Living Setting Upon Discharge

Home, to live with Family/Relatives. Transitional Living. 



CONCLUSION ON REHABILITATION NECESSITY:



I have evaluated patient's pre-admission functional status and, comparing it to the patient's post-ad
mission functional status now, I conclude that the pre-admission assessment was accurate. Patient's c
ondition on admission supports the medical necessity of admission to IRF. It is safe to proceed with 
patient's therapy program.



SIGNATURE PANEL:



Electronically signed by Dr. Dev Feliciano M.D. on 08/30/2022 at 16:40 (CDT)

## 2022-08-31 RX ADMIN — MAGNESIUM OXIDE TAB 400 MG (241.3 MG ELEMENTAL MG) SCH MG: 400 (241.3 MG) TAB at 09:10

## 2022-08-31 RX ADMIN — VALSARTAN SCH MG: 160 TABLET ORAL at 14:10

## 2022-08-31 RX ADMIN — VALSARTAN SCH: 80 TABLET ORAL at 17:08

## 2022-08-31 RX ADMIN — ACETAMINOPHEN PRN MG: 500 TABLET, FILM COATED ORAL at 21:18

## 2022-08-31 RX ADMIN — Medication SCH MG: at 21:20

## 2022-08-31 RX ADMIN — Medication SCH MG: at 09:11

## 2022-08-31 RX ADMIN — FAMOTIDINE SCH MG: 20 TABLET, FILM COATED ORAL at 09:12

## 2022-08-31 RX ADMIN — LACTOBACILLUS TAB SCH TAB: TAB at 09:11

## 2022-08-31 RX ADMIN — DIPHENHYDRAMINE HYDROCHLORIDE PRN MG: 25 CAPSULE ORAL at 21:18

## 2022-08-31 RX ADMIN — ASPIRIN SCH MG: 81 TABLET, COATED ORAL at 09:11

## 2022-08-31 RX ADMIN — Medication SCH: at 12:00

## 2022-08-31 RX ADMIN — AMLODIPINE BESYLATE SCH: 5 TABLET ORAL at 17:09

## 2022-08-31 RX ADMIN — FAMOTIDINE SCH MG: 20 TABLET, FILM COATED ORAL at 21:21

## 2022-08-31 RX ADMIN — RIVAROXABAN SCH MG: 10 TABLET, FILM COATED ORAL at 17:08

## 2022-08-31 RX ADMIN — MONTELUKAST SODIUM SCH MG: 10 TABLET, FILM COATED ORAL at 21:21

## 2022-08-31 NOTE — R.PN
PROGRESS NOTES



ENCOUNTER DATE AND TIME:



08/31/2022 17:19 (CDT)



NAME



NICOLE BUNN



YOB: 1965



DATE OF ADMISSION:



08/29/2022 12:59 (CDT)



DeconditioningCHIEF COMPLAINT:



Sepsis, debility



SUBJECTIVE:



Pt denied any Shortness of Breath.

Pt denied any depression.

CBC with diff is essentially normal. Potassium increased to 5.3, glucose 160.

Self-propelled wheelchair 500'  with standby assistance. She did multiple sit-to-stands in the  paral
lel bars.



VITAL SIGNS



Temperature: 97.4 F

SBP/DBP: 145/64

Pulse: 65

Resp: 16



MEDICATION ALLERGIES:



No Known Drug Allergies (NKDA)



ENVIRONMENTAL ALLERGIES:



- Substance Allergies

None Known

- Other Allergies

None Known



NURSING:



- Shower

allowing shower



ACTIVITIES



OOB only with supervision



THERAPIES:



- Dietary and Nutrition

Adequate Nutrition. Nutritional Education. Nutritional Supplements. 



- Occupational Therapy

Cognitive Retraining. Patient needs Occupational Therapy for a daily minimum of 1.5 hours at least 5 
out of 7 days, to improve Activities of Daily Living, including: Eating, Grooming, Bathing, Dressing,
 Toileting, Toilet Transfers, Community Reintegration, Higher functional activities, Adaptive Equipme
nt, Splinting, Household Tasks, and Other activities as determined. Visual Perceptual Training. 



- Physical Therapy

Patient needs Physical Therapy for a daily minimum of 1.5 hours at least 5 out of 7 days, to improve:
 Mobility, Strengthening, Transfers, Stretching, ROM, Endurance, Ability to manage stairs, Gait, and 
Balance. 



PHYSICAL EXAM



- Gen

Alert and awake

Lying in bed

No apparent distress

Oriented to: person, time, and place

- Skin

No skin breakdown.



No abnormalities

- Eyes

No abnormalities

- ENMT

No abnormalities

- Neck

No abnormalities

- CVS

RRR

- Chest

No abnormalities

- Abd

Obese, soft, nontender

- GI

Non distended



Deferred

- 

No abnormalities

- Ext

Mild bilateral lower extremity edema.

- MSK

4+/5 weakness in both lower extremities.

- Neuro

No focal deficits



ASSESSMENT:



Pt. is a 58 yo female of unknown race.On 08/13/2022 she was admitted to [facility] with di
agnosis Deconditioning.Her impairment category is Debility 16 -  Debility (16).Pre-morbidly, Pt. was 
independent/mod-I in Locomotion and Self-Care; and she had good Safety Awareness, Balance, Transfers 
Control, and Self-Care.Currently, she has deficits of Locomotion, Safety Awareness, Balance, Transfer
s Control, Self-Care, and Endurance.Pt. is now referred to [facility] for acut
e in-patient rehabilitation in order to maximize patient's functional independence in activities of d
aily living, strength, ROM, and mobility.- Rehab Goal

Patient has realistic goal of being discharged at assistance level 6-Nara to reside at Home with  Fam
michell/Relatives.

MDM/PLAN:



- Physical Therapy

 Gait dysfunction - to improve, our physical therapists will perform initial evaluation of pt's statu
s upon admission and devise an individualized program for Gait Training, and Wheel Chair mobility

 Inability to transfer - to improve, our physical therapists will perform initial evaluation of pt's 
status upon admission and devise an individualized program for Bed mobility

 Need for home safety evaluation - to improve, our physical therapists will perform initial evaluatio
n of pt's status upon admission and devise an individualized program for Home Evaluation

 Need in caregiver upon discharge - to improve, our physical therapists will perform initial evaluati
on of pt's status upon admission and devise an individualized program for Caregiver Training

 New precaution - to improve, our physical therapists will perform initial evaluation of pt's status 
upon admission and devise an individualized program for Patient precaution education

Edema - to improve, our physical therapists will perform initial evaluation of pt's status upon admis
abbi and devise an individualized program for Elevation Training, and Lymphedema Therapy

 Poor balance - to improve, our physical therapists will perform initial evaluation of pt's status up
on admission and devise an individualized program for Balance Training

 Poor endurance - to improve, our physical therapists will perform initial evaluation of pt's status 
upon admission and devise an individualized program for Endurance Training

 Weakness - to improve, our physical therapists will perform initial evaluation of pt's status upon a
dmission and devise an individualized program for Aquatic Therapy, Neuromuscular Reeducation, and Str
engthening

Achieving independence - to improve, our physical therapists will perform initial evaluation of pt's 
status upon admission and devise an individualized program for Community Reintegration Activities

- Occupational Therapy

 ADL deficits - to improve, our occupation therapists will perform initial evaluation of pt's status 
upon admission and devise an individualized program for Bathing, Bed mobility, Community Reintegratio
n, Cooking, Dressing, Eating, Fine Motor Skills, Grooming, Homemaking, Kitchen Mobility, Laundry, Pat
ient Education, Safety Awareness, Splinting - Positioning, Transfers(Toilet, Tub, Shower), and Wheel 
Chair Management

 Need for care giver - to improve, our occupation therapists will perform initial evaluation of pt's 
status upon admission and devise an individualized program for Caregiver Training

 Weakness - to improve, our occupation therapists will perform initial evaluation of pt's status upon
 admission and devise an individualized program for Aquatic Therapy, Balance, Endurance, UE ROM, and 
UE strengthening

- Other

 See attached MAR (Medication Administration Record)

- Diet Type

Continue Regular

- Diet - Liquid Texture

Continue Regular

- Tube Feed

Continue N/A

- Diet - Solid Texture

Continue Regular

- Shower

 allowing shower



FUNCTIONAL STATUS: UPDATED AT WEEKLY TEAM CONFERENCE



- Bladder

Same accident frequency: 7-Ind - No accidents in the past 7 days

- Bowel

Same accident frequency: 7-Ind - No accidents in the past 7 days

- Walking

Same score based on distance walked: 0(N/A)

- Wheelchair

Same score based on distance traveled: 0(N/A)



FUNCTIONAL STATUS:



- Self-Care

A. Eating    Ind   

B. Grooming    Nara   

C. Bathing    modA   

D. Dressing - Upper     Maureen   

E. Dressing - Lower     modA   

F. Toileting    sup   

- Sphincter Control

G. Bladder control     Nara   

H. Bowel control     Nara   

- Transfers Control

I. Bed/Chair/Wheelchair     modA   

J. Toilet    modA   

K. Tub/Shower    modA   

- Locomotion

L. Walk/Wheelchair (B)     modA   

M. Stairs    Maureen   

- Communication

N. Comprehension (B)     Nara   

O. Expression (B)     Nara   

- Social Cognition

P. Social Interaction    Nara   

Q. Problem Solving    Nara   

R. Memory    Nara   

- Endurance

Fair

- Balance

Poor

- Safety Awareness

Good



QI SCORES:



- Self-Care

A. Eating  04-Supervision or touching assistance  

B. Oral hygiene  04-Supervision or touching assistance  

C. Toileting hygiene  02-Substantial/maximal assistance  

E. Shower/bathe self  02-Substantial/maximal assistance  

F. Upper body dressing  03-Partial/moderate assistance  

G. Lower body dressing  02-Substantial/maximal assistance  

H. Putting on/taking off footwear  02-Substantial/maximal assistance  

- Mobility

A. Roll left and right  04-Supervision or touching assistance  

B. Sit to lying  04-Supervision or touching assistance  

C. Lying to sitting on side of bed  04-Supervision or touching assistance  

D. Sit to stand  02-Substantial/maximal assistance  

E. Chair/bed-to-chair transfer  02-Substantial/maximal assistance  

F. Toilet transfer  02-Substantial/maximal assistance  

G. Car transfer  88-Not attempted due to medical condition or safety concerns  

I. Walk 10 feet  88-Not attempted due to medical condition or safety concerns  

J. Walk 50 feet with two turns  88-Not attempted due to medical condition or safety concerns  

K. Walk 150 feet  88-Not attempted due to medical condition or safety concerns  

L. Walking 10 feet on uneven surfaces  88-Not attempted due to medical condition or safety concerns  


M. 1 step (curb)  88-Not attempted due to medical condition or safety concerns  

N. 4 steps  88-Not attempted due to medical condition or safety concerns  

O. 12 steps  88-Not attempted due to medical condition or safety concerns  

P. Picking up object  88-Not attempted due to medical condition or safety concerns  

R. Wheel 50 feet with two turns  09-Not applicable  

S. Wheel 150 feet  09-Not applicable  

- Bladder and Bowel

Bladder continence  0-Always continent  

Bowel continence  0-Always continent  

- Endurance

Poor

- Balance

Poor

- Safety Awareness

Fair



[facility] DEFICITS:



Self-Care, Mobility, Endurance, Balance, and Safety Awareness



SIGNATURE PANEL:



Electronically signed by Dr. Dev Feliciano M.D. on 08/31/2022 at 17:27 (CDT)

## 2022-09-01 LAB
ALBUMIN SERPL BCP-MCNC: 2.7 G/DL (ref 3.4–5)
BUN BLD-MCNC: 42 MG/DL (ref 7–18)
GLUCOSE SERPLBLD-MCNC: 173 MG/DL (ref 74–106)
HCT VFR BLD CALC: 33.9 % (ref 36–45)
LYMPHOCYTES # SPEC AUTO: 1.1 K/UL (ref 0.7–4.9)
MAGNESIUM SERPL-MCNC: 2.3 MG/DL (ref 1.8–2.4)
MCV RBC: 90.7 FL (ref 80–100)
PMV BLD: 7.7 FL (ref 7.6–11.3)
POTASSIUM SERPL-SCNC: 5.5 MMOL/L (ref 3.5–5.1)
PREALB SERPL-MCNC: 32.8 MG/DL (ref 20–40)
RBC # BLD: 3.73 M/UL (ref 3.86–4.86)

## 2022-09-01 RX ADMIN — ACETAMINOPHEN PRN MG: 500 TABLET, FILM COATED ORAL at 03:56

## 2022-09-01 RX ADMIN — MONTELUKAST SODIUM SCH MG: 10 TABLET, FILM COATED ORAL at 19:59

## 2022-09-01 RX ADMIN — Medication SCH MG: at 19:59

## 2022-09-01 RX ADMIN — AMLODIPINE BESYLATE SCH MG: 5 TABLET ORAL at 09:32

## 2022-09-01 RX ADMIN — ACETAMINOPHEN PRN MG: 500 TABLET, FILM COATED ORAL at 20:01

## 2022-09-01 RX ADMIN — DIPHENHYDRAMINE HYDROCHLORIDE PRN MG: 25 CAPSULE ORAL at 19:59

## 2022-09-01 RX ADMIN — VALSARTAN SCH MG: 80 TABLET ORAL at 09:31

## 2022-09-01 RX ADMIN — FAMOTIDINE SCH MG: 20 TABLET, FILM COATED ORAL at 20:00

## 2022-09-01 RX ADMIN — AMLODIPINE BESYLATE SCH: 5 TABLET ORAL at 20:00

## 2022-09-01 RX ADMIN — ASPIRIN SCH MG: 81 TABLET, COATED ORAL at 08:42

## 2022-09-01 RX ADMIN — VALSARTAN SCH: 80 TABLET ORAL at 20:00

## 2022-09-01 RX ADMIN — Medication SCH MG: at 08:42

## 2022-09-01 RX ADMIN — FAMOTIDINE SCH MG: 20 TABLET, FILM COATED ORAL at 08:42

## 2022-09-01 RX ADMIN — Medication SCH: at 11:50

## 2022-09-01 RX ADMIN — RIVAROXABAN SCH MG: 10 TABLET, FILM COATED ORAL at 16:54

## 2022-09-01 RX ADMIN — MAGNESIUM OXIDE TAB 400 MG (241.3 MG ELEMENTAL MG) SCH MG: 400 (241.3 MG) TAB at 08:42

## 2022-09-01 RX ADMIN — LACTOBACILLUS TAB SCH TAB: TAB at 08:42

## 2022-09-02 LAB
BUN BLD-MCNC: 33 MG/DL (ref 7–18)
GLUCOSE SERPLBLD-MCNC: 149 MG/DL (ref 74–106)
POTASSIUM SERPL-SCNC: 4.8 MMOL/L (ref 3.5–5.1)

## 2022-09-02 RX ADMIN — FAMOTIDINE SCH MG: 20 TABLET, FILM COATED ORAL at 08:32

## 2022-09-02 RX ADMIN — MELATONIN PRN MG: 3 TAB ORAL at 20:07

## 2022-09-02 RX ADMIN — FAMOTIDINE SCH MG: 20 TABLET, FILM COATED ORAL at 20:04

## 2022-09-02 RX ADMIN — Medication SCH MG: at 08:32

## 2022-09-02 RX ADMIN — AMLODIPINE BESYLATE SCH MG: 5 TABLET ORAL at 08:33

## 2022-09-02 RX ADMIN — VALSARTAN SCH MG: 80 TABLET ORAL at 10:10

## 2022-09-02 RX ADMIN — MAGNESIUM OXIDE TAB 400 MG (241.3 MG ELEMENTAL MG) SCH MG: 400 (241.3 MG) TAB at 08:32

## 2022-09-02 RX ADMIN — ACETAMINOPHEN PRN MG: 500 TABLET, FILM COATED ORAL at 09:10

## 2022-09-02 RX ADMIN — Medication SCH: at 11:57

## 2022-09-02 RX ADMIN — LACTOBACILLUS TAB SCH TAB: TAB at 08:32

## 2022-09-02 RX ADMIN — VALSARTAN SCH MG: 80 TABLET ORAL at 20:01

## 2022-09-02 RX ADMIN — RIVAROXABAN SCH MG: 10 TABLET, FILM COATED ORAL at 16:55

## 2022-09-02 RX ADMIN — MONTELUKAST SODIUM SCH MG: 10 TABLET, FILM COATED ORAL at 20:04

## 2022-09-02 RX ADMIN — AMLODIPINE BESYLATE SCH MG: 5 TABLET ORAL at 20:04

## 2022-09-02 RX ADMIN — ACETAMINOPHEN PRN MG: 500 TABLET, FILM COATED ORAL at 22:06

## 2022-09-02 RX ADMIN — DIPHENHYDRAMINE HYDROCHLORIDE PRN MG: 25 CAPSULE ORAL at 22:06

## 2022-09-02 RX ADMIN — ASPIRIN SCH MG: 81 TABLET, COATED ORAL at 08:33

## 2022-09-02 RX ADMIN — Medication SCH MG: at 20:00

## 2022-09-02 NOTE — P.RH.PN
Estimated Length of Stay: 13


Expected Discharge Date: 09/10/22


Discharge Disposition Plan: Home


Family Support: Yes


Long Term Goal: Mobility, Transfers, Self Care


Vital Signs: 


                                Last Vital Signs











Temp  98.0 F   09/02/22 08:44


 


Pulse  59   09/02/22 10:10


 


Resp  14   09/02/22 08:44


 


BP  150/68 H  09/02/22 10:10


 


Pulse Ox  98   09/02/22 08:44











Laboratory: 


                             Laboratory Last Values











WBC  8.00 K/uL (4.3-10.9)  D 09/01/22  04:21    


 


RBC  3.73 M/uL (3.86-4.86)  L  09/01/22  04:21    


 


Hgb  11.5 g/dL (12.0-15.0)  L  09/01/22  04:21    


 


Hct  33.9 % (36.0-45.0)  L  09/01/22  04:21    


 


MCV  90.7 fL ()   09/01/22  04:21    


 


MCH  30.9 pg (27.0-35.0)   09/01/22  04:21    


 


MCHC  34.0 g/dL (32.0-36.0)   09/01/22  04:21    


 


RDW  15.2 % (12.1-15.2)   09/01/22  04:21    


 


Plt Count  283 K/uL (152-406)   09/01/22  04:21    


 


MPV  7.7 fL (7.6-11.3)   09/01/22  04:21    


 


Neutrophils %  62.8 % (41.7-73.7)   09/01/22  04:21    


 


Lymphocytes %  13.4 % (15.3-44.8)  L  09/01/22  04:21    


 


Monocytes %  7.6 % (3.3-12.3)   09/01/22  04:21    


 


Eosinophils %  14.0 % (0-4.4)  H  09/01/22  04:21    


 


Basophils %  2.2 % (0-1.3)  H  09/01/22  04:21    


 


Absolute Neutrophils  5.0 K/uL (1.8-8.0)   09/01/22  04:21    


 


Absolute Lymphocytes  1.1 K/uL (0.7-4.9)   09/01/22  04:21    


 


Absolute Monocytes  0.6 K/uL (0.1-1.3)   09/01/22  04:21    


 


Absolute Eosinophils  1.1 K/uL (0-0.5)  H  09/01/22  04:21    


 


Absolute Basophils  0.2 K/uL (0-0.5)   09/01/22  04:21    


 


Sodium  139 mmol/L (136-145)   09/02/22  04:15    


 


Potassium  4.8 mmol/L (3.5-5.1)   09/02/22  04:15    


 


Chloride  107 mmol/L ()   09/02/22  04:15    


 


Carbon Dioxide  26 mmol/L (21-32)   09/02/22  04:15    


 


Anion Gap  10.8 mEq/L (5.0-15.0)   09/02/22  04:15    


 


BUN  33 mg/dL (7-18)  H  09/02/22  04:15    


 


Creatinine  0.79 mg/dL (0.55-1.3)   09/02/22  04:15    


 


Est GFR (CKD-EPI)  87 ml/min (=/>90)  L  09/02/22  04:15    


 


Glucose  149 mg/dL ()  H  09/02/22  04:15    


 


Calcium  8.8 mg/dL (8.5-10.1)   09/02/22  04:15    


 


Magnesium  2.3 mg/dL (1.8-2.4)   09/01/22  04:21    


 


Albumin  2.7 g/dL (3.4-5.0)  L  09/01/22  04:21    


 


Prealbumin  32.8 mg/dL (20-40)   09/01/22  04:21    


 


Urine Color  Yellow  (Yellow)   08/29/22  21:01    


 


Urine Clarity  Cloudy  (Clear)   08/29/22  21:01    


 


Urine pH  5.5  (5.0-7.0)   08/29/22  21:01    


 


Ur Specific Gravity  1.020  (1.005-1.030)   08/29/22  21:01    


 


Glucose (UA)(Auto)  Negative  (Negative)   08/29/22  21:01    


 


Urine Ketones  Negative  (Negative)   08/29/22  21:01    


 


Urine Blood  3+  (Negative)  H  08/29/22  21:01    


 


Urine Nitrite  Negative  (Negative)   08/29/22  21:01    


 


Urine Bilirubin  Negative  (Negative)   08/29/22  21:01    


 


Urine Urobilinogen  0.2 mg/dL (0.2-1.0)   08/29/22  21:01    


 


Ur Leukocyte Esterase  Negative  (Negative)   08/29/22  21:01    


 


Urine RBC  11-20 /HPF (None Seen)  H  08/29/22  21:01    


 


Urine Red Cell Clumps  Cancelled   08/29/22  20:40    


 


Urine WBC  <5 /HPF (<5)   08/29/22  21:01    


 


Urine WBC Clumps  Cancelled   08/29/22  20:40    


 


Ur Squamous Epith Cells  <5 /HPF (None Seen)   08/29/22  21:01    


 


U Non-Squamous Epi Cells  Cancelled   08/29/22  20:40    


 


Ur Transition Epith Cell  Cancelled   08/29/22  20:40    


 


Ur Renal Epithelial Cell  Cancelled   08/29/22  20:40    


 


Calcium Carbonate Cryst  Cancelled   08/29/22  20:40    


 


Calcium Oxalate Crystal  Cancelled   08/29/22  20:40    


 


Leucine Crystals  Cancelled   08/29/22  20:40    


 


Cystine Crystals  Cancelled   08/29/22  20:40    


 


Uric Acid Crystals  Cancelled   08/29/22  20:40    


 


Triple Phos Crystals  Cancelled   08/29/22  20:40    


 


Tyrosine Crystals  Cancelled   08/29/22  20:40    


 


Unidentified Crystals  Cancelled   08/29/22  20:40    


 


Amorphous Crystals  Cancelled   08/29/22  20:40    


 


Urine Bacteria  >50 /HPF (<20)  H  08/29/22  21:01    


 


Hyaline Casts  Cancelled   08/29/22  20:40    


 


Granular Casts  Cancelled   08/29/22  20:40    


 


Waxy Casts  Cancelled   08/29/22  20:40    


 


RBC Casts  Cancelled   08/29/22  20:40    


 


WBC Casts  Cancelled   08/29/22  20:40    


 


Urine Mucus  Cancelled   08/29/22  20:40    


 


Urine Trichomonas  Cancelled   08/29/22  20:40    


 


Ur Yeast w Hyphae  Cancelled   08/29/22  20:40    


 


Urine Yeast (Budding)  Cancelled   08/29/22  20:40    


 


Urine Sperm  Cancelled   08/29/22  20:40    


 


Ur Oval Fat Bodies  Cancelled   08/29/22  20:40    


 


Urine Total Protein  1+  (Negative)  H  08/29/22  21:01    


 


Urine Ascorbic Acid  Cancelled   08/29/22  20:40    


 


Urine Fat  Cancelled   08/29/22  20:40    











Weight: 290 lb


Wound Present: Yes


Closed Surgical Incision Present: No


Negative Pressure Wound Therapy Present: No


Physician Update: Sit to stand, transfers, in parallel bars CGA, ' SBA. 

Shower, bathing, lower body dressing at max assistance.


Comment: noted jahaira lower ext discoloration and scaly skin


Functional Improvement: Patient was fairly newly admitted, however demonstrates 

good work ethic, and desire to improve.  Patient continues to be fearful of 

"falling".


Summary: Patient's care plan and long term goals have been reviewed and revised 

as necessary. Please see the Rehabilitation Signature page for all necessary 

signatures.

## 2022-09-03 PROCEDURE — 0HBRXZZ EXCISION OF TOE NAIL, EXTERNAL APPROACH: ICD-10-PCS

## 2022-09-03 RX ADMIN — RIVAROXABAN SCH MG: 10 TABLET, FILM COATED ORAL at 17:14

## 2022-09-03 RX ADMIN — DIPHENHYDRAMINE HYDROCHLORIDE PRN MG: 25 CAPSULE ORAL at 20:28

## 2022-09-03 RX ADMIN — AMLODIPINE BESYLATE SCH: 5 TABLET ORAL at 20:00

## 2022-09-03 RX ADMIN — MAGNESIUM OXIDE TAB 400 MG (241.3 MG ELEMENTAL MG) SCH MG: 400 (241.3 MG) TAB at 07:30

## 2022-09-03 RX ADMIN — ASPIRIN SCH MG: 81 TABLET, COATED ORAL at 07:30

## 2022-09-03 RX ADMIN — Medication SCH: at 11:58

## 2022-09-03 RX ADMIN — ACETAMINOPHEN PRN MG: 500 TABLET, FILM COATED ORAL at 20:28

## 2022-09-03 RX ADMIN — VALSARTAN SCH: 80 TABLET ORAL at 20:00

## 2022-09-03 RX ADMIN — FAMOTIDINE SCH MG: 20 TABLET, FILM COATED ORAL at 07:31

## 2022-09-03 RX ADMIN — SENNOSIDES AND DOCUSATE SODIUM PRN TAB: 8.6; 5 TABLET ORAL at 20:29

## 2022-09-03 RX ADMIN — VALSARTAN SCH MG: 80 TABLET ORAL at 10:16

## 2022-09-03 RX ADMIN — LACTOBACILLUS TAB SCH TAB: TAB at 07:30

## 2022-09-03 RX ADMIN — MONTELUKAST SODIUM SCH MG: 10 TABLET, FILM COATED ORAL at 20:28

## 2022-09-03 RX ADMIN — FAMOTIDINE SCH MG: 20 TABLET, FILM COATED ORAL at 20:28

## 2022-09-03 RX ADMIN — Medication SCH MG: at 07:30

## 2022-09-03 RX ADMIN — AMLODIPINE BESYLATE SCH MG: 5 TABLET ORAL at 08:30

## 2022-09-03 RX ADMIN — Medication SCH MG: at 20:28

## 2022-09-03 NOTE — P.CNS
Date of Consult: 09/03/22


Reason for Consult: Painful toenails


Chief Complaint: Painful elongated toenails


History of Present Illness: 





Patient unable to reach toenails


Allergies





amoxicillin [From Augmentin] Allergy (Unknown, Verified 08/13/22 02:07)


   unknown


clavulanic acid [From Augmentin] Allergy (Unknown, Verified 08/13/22 02:07)


   unknown


codeine Allergy (Verified 08/13/22 02:07)


   unknown


furosemide [From Lasix] Allergy (Verified 08/17/22 15:35)


   Hives/Rash


morphine Allergy (Verified 08/20/22 08:46)


   Rash


sulfamethoxazole [From Bactrim] Adverse Reaction (Verified 08/13/22 02:07)


   Hives/Rash


trimethoprim [From Bactrim] Adverse Reaction (Verified 08/13/22 02:07)


   Hives/Rash





Home Medications: 








Famotidine 20 mg PO BID 08/13/22 


Albuterol Inhaler [Ventolin Inhaler*] 1 puff IH Q6H PRN 08/29/22 


Hydrocortisone Oint [Cortizone 1% Ointment*] 1 appl TOP TID PRN  tube 08/29/22 


Montelukast [Singulair*] 10 mg PO BEDTIME  tab 08/29/22 


Rivaroxaban [Xarelto*] 10 mg PO DAILY AT SUPPER 08/29/22 


Valsartan [Diovan*] 160 mg PO BID  tab 08/29/22 


carvediloL [Coreg*] 12.5 mg PO BID 6AM 6PM  tab 08/29/22 


predniSONE [Deltasone*] 10 mg PO BID 19 Days  tab 08/29/22 


Acetaminophen [Tylenol Extra Strength] 500 mg PO Q6HP PRN 08/30/22 


Acidophilus/Bulgaricus [Lactinex Tablet Chewable] 1 each PO DAILY 08/30/22 


Amlodipine [Norvasc] 10 mg PO DAILY 08/30/22 


Aspirin [Aspirin EC] 81 mg PO DAILY 08/30/22 


Diphenhydramine HCl [Benadryl Allergy] 25 mg PO Q6HP PRN 08/30/22 


Fluocinonide [Lidex] 30 gm TP BID PRN 08/30/22 


Ondansetron [Zofran] 4 mg PO Q6H PRN 08/30/22 








- Past Medical/Surgical History


Diabetic: No


-: Hypertension


-: Hyperlipidemia


-: Guillain-Barr syndrome


-: Left foot


-: Carpal tunnel


Psychosocial/ Personal History: Patient is disabled, lives at home with her 







- Family History


  ** Mother


Medical History: Diabetes





- Social History


Alcohol use: No


CD- Drugs: No


Caffeine use: Yes


Place of Residence: Home





Review of Systems


10-point ROS is otherwise unremarkable





Physical Examination














Temp Pulse Resp BP Pulse Ox


 


 98.2 F   57   16   137/63   95 


 


 09/02/22 20:20  09/03/22 08:30  09/02/22 20:20  09/03/22 08:30  09/02/22 20:20








General: Alert, In no apparent distress, Oriented x3


Cardiovascular: Edema (bilateral lower extremity), Abnormal pulses (1/4 dp pulse

 bilateral, 0/4 pt pulse bilateral)


Capillary refill: >2 Seconds


Musculoskeletal: No clubbing, No swelling, No contractures, No erythema, No 

tenderness, No warmth


Integumentary: No rashes, No breakdown, No significant lesion, No 

tenderness/swelling, No erythema, No warmth, No cyanosis, Other (Thickened 

hypertrophic nails bilateral hallux, elongated dystrophic nails 2-5 bilateral)


Neurological: Sensation intact





- Problems


(1) Tinea unguium


Current Visit: Yes   Status: Acute   





(2) Onychogryphosis


Current Visit: Yes   Status: Acute   





(3) Generalized atherosclerosis


Current Visit: Yes   Status: Acute   


Conclusions/Impression: 


Nails debrided at bedside, patient tolerated well


Physician Review: Patient Assessed, Agree with Above Assessment and Plan

## 2022-09-04 LAB
BUN BLD-MCNC: 27 MG/DL (ref 7–18)
GLUCOSE SERPLBLD-MCNC: 170 MG/DL (ref 74–106)
HCT VFR BLD CALC: 34 % (ref 36–45)
LYMPHOCYTES # SPEC AUTO: 1.4 K/UL (ref 0.7–4.9)
MCV RBC: 91.1 FL (ref 80–100)
PMV BLD: 8.3 FL (ref 7.6–11.3)
POTASSIUM SERPL-SCNC: 4.6 MMOL/L (ref 3.5–5.1)
RBC # BLD: 3.74 M/UL (ref 3.86–4.86)

## 2022-09-04 RX ADMIN — Medication SCH MG: at 20:58

## 2022-09-04 RX ADMIN — ASPIRIN SCH MG: 81 TABLET, COATED ORAL at 08:32

## 2022-09-04 RX ADMIN — Medication SCH: at 12:00

## 2022-09-04 RX ADMIN — FAMOTIDINE SCH MG: 20 TABLET, FILM COATED ORAL at 08:32

## 2022-09-04 RX ADMIN — AMLODIPINE BESYLATE SCH MG: 5 TABLET ORAL at 20:59

## 2022-09-04 RX ADMIN — VALSARTAN SCH MG: 80 TABLET ORAL at 08:31

## 2022-09-04 RX ADMIN — LACTOBACILLUS TAB SCH TAB: TAB at 08:32

## 2022-09-04 RX ADMIN — AMLODIPINE BESYLATE SCH MG: 5 TABLET ORAL at 08:32

## 2022-09-04 RX ADMIN — RIVAROXABAN SCH MG: 10 TABLET, FILM COATED ORAL at 16:56

## 2022-09-04 RX ADMIN — FLUOCINONIDE PRN APPL: 0.5 CREAM TOPICAL at 10:14

## 2022-09-04 RX ADMIN — VALSARTAN SCH MG: 80 TABLET ORAL at 20:59

## 2022-09-04 RX ADMIN — MONTELUKAST SODIUM SCH MG: 10 TABLET, FILM COATED ORAL at 20:58

## 2022-09-04 RX ADMIN — DIPHENHYDRAMINE HYDROCHLORIDE PRN MG: 25 CAPSULE ORAL at 21:00

## 2022-09-04 RX ADMIN — Medication SCH MG: at 08:32

## 2022-09-04 RX ADMIN — MAGNESIUM OXIDE TAB 400 MG (241.3 MG ELEMENTAL MG) SCH MG: 400 (241.3 MG) TAB at 08:31

## 2022-09-04 RX ADMIN — ACETAMINOPHEN PRN MG: 500 TABLET, FILM COATED ORAL at 20:58

## 2022-09-04 RX ADMIN — FAMOTIDINE SCH MG: 20 TABLET, FILM COATED ORAL at 20:58

## 2022-09-05 RX ADMIN — VALSARTAN SCH MG: 80 TABLET ORAL at 07:35

## 2022-09-05 RX ADMIN — FAMOTIDINE SCH MG: 20 TABLET, FILM COATED ORAL at 20:19

## 2022-09-05 RX ADMIN — RIVAROXABAN SCH MG: 10 TABLET, FILM COATED ORAL at 16:56

## 2022-09-05 RX ADMIN — ASPIRIN SCH MG: 81 TABLET, COATED ORAL at 07:41

## 2022-09-05 RX ADMIN — AMLODIPINE BESYLATE SCH MG: 5 TABLET ORAL at 07:41

## 2022-09-05 RX ADMIN — Medication SCH MG: at 20:19

## 2022-09-05 RX ADMIN — LACTOBACILLUS TAB SCH TAB: TAB at 07:35

## 2022-09-05 RX ADMIN — VALSARTAN SCH MG: 80 TABLET ORAL at 20:19

## 2022-09-05 RX ADMIN — Medication SCH MG: at 07:40

## 2022-09-05 RX ADMIN — MONTELUKAST SODIUM SCH MG: 10 TABLET, FILM COATED ORAL at 20:19

## 2022-09-05 RX ADMIN — AMLODIPINE BESYLATE SCH MG: 5 TABLET ORAL at 20:19

## 2022-09-05 RX ADMIN — FAMOTIDINE SCH MG: 20 TABLET, FILM COATED ORAL at 07:41

## 2022-09-05 RX ADMIN — Medication SCH: at 11:15

## 2022-09-05 RX ADMIN — MELATONIN PRN MG: 3 TAB ORAL at 20:21

## 2022-09-05 RX ADMIN — MAGNESIUM OXIDE TAB 400 MG (241.3 MG ELEMENTAL MG) SCH MG: 400 (241.3 MG) TAB at 10:38

## 2022-09-06 RX ADMIN — FLUOCINONIDE PRN APPL: 0.5 CREAM TOPICAL at 14:57

## 2022-09-06 RX ADMIN — RIVAROXABAN SCH MG: 10 TABLET, FILM COATED ORAL at 17:06

## 2022-09-06 RX ADMIN — ACETAMINOPHEN PRN MG: 500 TABLET, FILM COATED ORAL at 07:12

## 2022-09-06 RX ADMIN — MAGNESIUM OXIDE TAB 400 MG (241.3 MG ELEMENTAL MG) SCH MG: 400 (241.3 MG) TAB at 08:14

## 2022-09-06 RX ADMIN — FAMOTIDINE SCH MG: 20 TABLET, FILM COATED ORAL at 20:27

## 2022-09-06 RX ADMIN — LACTOBACILLUS TAB SCH TAB: TAB at 08:14

## 2022-09-06 RX ADMIN — AMLODIPINE BESYLATE SCH MG: 5 TABLET ORAL at 20:27

## 2022-09-06 RX ADMIN — ASPIRIN SCH MG: 81 TABLET, COATED ORAL at 08:14

## 2022-09-06 RX ADMIN — MELATONIN PRN MG: 3 TAB ORAL at 20:27

## 2022-09-06 RX ADMIN — FAMOTIDINE SCH MG: 20 TABLET, FILM COATED ORAL at 08:13

## 2022-09-06 RX ADMIN — Medication SCH: at 12:00

## 2022-09-06 RX ADMIN — VALSARTAN SCH MG: 80 TABLET ORAL at 20:27

## 2022-09-06 RX ADMIN — MONTELUKAST SODIUM SCH MG: 10 TABLET, FILM COATED ORAL at 20:27

## 2022-09-06 RX ADMIN — VALSARTAN SCH MG: 80 TABLET ORAL at 08:15

## 2022-09-06 RX ADMIN — Medication SCH MG: at 20:27

## 2022-09-06 RX ADMIN — Medication SCH MG: at 08:14

## 2022-09-06 RX ADMIN — ACETAMINOPHEN PRN MG: 500 TABLET, FILM COATED ORAL at 14:28

## 2022-09-06 RX ADMIN — AMLODIPINE BESYLATE SCH MG: 5 TABLET ORAL at 08:14

## 2022-09-06 NOTE — R.PN
PROGRESS NOTES



ENCOUNTER DATE AND TIME:



09/06/2022 18:32 (CDT)



NAME



NICOLE BUNN



YOB: 1965



DATE OF ADMISSION:



08/29/2022 12:59 (CDT)



DeconditioningCHIEF COMPLAINT:



Sepsis, debility



SUBJECTIVE:



Pt denied any Shortness of Breath.

Pt denied any depression.

CBC with diff is essentially normal. Potassium is normal at 4.6, glucose 170.

Self-propelled wheelchair 500'  with independence. Ambulated 8' x 4 in the parallel bars.



VITAL SIGNS



Temperature: 98.6 F

SBP/DBP: 153/65

Pulse: 80

Resp: 16



MEDICATION ALLERGIES:



No Known Drug Allergies (NKDA)



ENVIRONMENTAL ALLERGIES:



- Substance Allergies

None Known

- Other Allergies

None Known



NURSING:



- Shower

allowing shower



ACTIVITIES



OOB only with supervision



THERAPIES:



- Dietary and Nutrition

Adequate Nutrition. Nutritional Education. Nutritional Supplements. 



- Occupational Therapy

Cognitive Retraining. Patient needs Occupational Therapy for a daily minimum of 1.5 hours at least 5 
out of 7 days, to improve Activities of Daily Living, including: Eating, Grooming, Bathing, Dressing,
 Toileting, Toilet Transfers, Community Reintegration, Higher functional activities, Adaptive Equipme
nt, Splinting, Household Tasks, and Other activities as determined. Visual Perceptual Training. 



- Physical Therapy

Patient needs Physical Therapy for a daily minimum of 1.5 hours at least 5 out of 7 days, to improve:
 Mobility, Strengthening, Transfers, Stretching, ROM, Endurance, Ability to manage stairs, Gait, and 
Balance. 



PHYSICAL EXAM



- Gen

Alert and awake

Lying in bed

No apparent distress

Oriented to: person, time, and place

- Skin

No skin breakdown.



No abnormalities

- Eyes

No abnormalities

- ENMT

No abnormalities

- Neck

No abnormalities

- CVS

RRR

- Chest

No abnormalities

- Abd

Obese, soft, nontender

- GI

Non distended



Deferred

- 

No abnormalities

- Ext

Mild bilateral lower extremity edema.

- MSK

4+/5 weakness in both lower extremities.

- Neuro

No focal deficits



ASSESSMENT:



Pt. is a 56 yo female of unknown race.On 08/13/2022 she was admitted to [facility] with di
agnosis Deconditioning.Her impairment category is Debility 16 -  Debility (16).Pre-morbidly, Pt. was 
independent/mod-I in Locomotion and Self-Care; and she had good Safety Awareness, Balance, Transfers 
Control, and Self-Care.Currently, she has deficits of Locomotion, Safety Awareness, Balance, Transfer
s Control, Self-Care, and Endurance.Pt. is now referred to [facility] for acut
e in-patient rehabilitation in order to maximize patient's functional independence in activities of d
aily living, strength, ROM, and mobility.- Rehab Goal

Patient has realistic goal of being discharged at assistance level 6-Nara to reside at Home with  Fam
michell/Relatives.

MDM/PLAN:



- Physical Therapy

 Gait dysfunction - to improve, our physical therapists will perform initial evaluation of pt's statu
s upon admission and devise an individualized program for Gait Training, and Wheel Chair mobility

 Inability to transfer - to improve, our physical therapists will perform initial evaluation of pt's 
status upon admission and devise an individualized program for Bed mobility

 Need for home safety evaluation - to improve, our physical therapists will perform initial evaluatio
n of pt's status upon admission and devise an individualized program for Home Evaluation

 Need in caregiver upon discharge - to improve, our physical therapists will perform initial evaluati
on of pt's status upon admission and devise an individualized program for Caregiver Training

 New precaution - to improve, our physical therapists will perform initial evaluation of pt's status 
upon admission and devise an individualized program for Patient precaution education

 Edema - to improve, our physical therapists will perform initial evaluation of pt's status upon admi
ssion and devise an individualized program for Elevation Training, and Lymphedema Therapy

 Poor balance - to improve, our physical therapists will perform initial evaluation of pt's status up
on admission and devise an individualized program for Balance Training

 Poor endurance - to improve, our physical therapists will perform initial evaluation of pt's status 
upon admission and devise an individualized program for Endurance Training

 Weakness - to improve, our physical therapists will perform initial evaluation of pt's status upon a
dmission and devise an individualized program for Aquatic Therapy, Neuromuscular Reeducation, and Str
engthening

 Achieving independence - to improve, our physical therapists will perform initial evaluation of pt's
 status upon admission and devise an individualized program for Community Reintegration Activities

- Occupational Therapy

 ADL deficits - to improve, our occupation therapists will perform initial evaluation of pt's status 
upon admission and devise an individualized program for Bathing, Bed mobility, Community Reintegratio
n, Cooking, Dressing, Eating, Fine Motor Skills, Grooming, Homemaking, Kitchen Mobility, Laundry, Pat
ient Education, Safety Awareness, Splinting - Positioning, Transfers(Toilet, Tub, Shower), and Wheel 
Chair Management

 Need for care giver - to improve, our occupation therapists will perform initial evaluation of pt's 
status upon admission and devise an individualized program for Caregiver Training

 Weakness - to improve, our occupation therapists will perform initial evaluation of pt's status upon
 admission and devise an individualized program for Aquatic Therapy, Balance, Endurance, UE ROM, and 
UE strengthening

- Other

 See attached MAR (Medication Administration Record)

- Diet Type

Continue Regular

- Diet - Liquid Texture

Continue Regular

- Tube Feed

Continue N/A

- Diet - Solid Texture

Continue Regular

- Shower

 allowing shower



FUNCTIONAL STATUS: UPDATED AT WEEKLY TEAM CONFERENCE



- Bladder

Same accident frequency: 7-Ind - No accidents in the past 7 days

- Bowel

Same accident frequency: 7-Ind - No accidents in the past 7 days

- Walking

Same score based on distance walked: 0(N/A)

- Wheelchair

Same score based on distance traveled: 0(N/A)



FUNCTIONAL STATUS:



- Self-Care

A. Eating    Ind   

B. Grooming    Nara   

C. Bathing    modA   

D. Dressing - Upper     Maureen   

E. Dressing - Lower     modA   

F. Toileting    sup   

- Sphincter Control

G. Bladder control     Nara   

H. Bowel control     Nara   

- Transfers Control

I. Bed/Chair/Wheelchair     modA   

J. Toilet    modA   

K. Tub/Shower    modA   

- Locomotion

L. Walk/Wheelchair (B)     modA   

M. Stairs    Maureen   

- Communication

N. Comprehension (B)     Nara   

O. Expression (B)     Nara   

- Social Cognition

P. Social Interaction    Nara   

Q. Problem Solving    Nara   

R. Memory    Nara   

- Endurance

Fair

- Balance

Poor

- Safety Awareness

Good



QI SCORES:



- Self-Care

A. Eating  04-Supervision or touching assistance  

B. Oral hygiene  04-Supervision or touching assistance  

C. Toileting hygiene  02-Substantial/maximal assistance  

E. Shower/bathe self  02-Substantial/maximal assistance  

F. Upper body dressing  03-Partial/moderate assistance  

G. Lower body dressing  02-Substantial/maximal assistance  

H. Putting on/taking off footwear  02-Substantial/maximal assistance  

- Mobility

A. Roll left and right  04-Supervision or touching assistance  

B. Sit to lying  04-Supervision or touching assistance  

C. Lying to sitting on side of bed  04-Supervision or touching assistance  

D. Sit to stand  02-Substantial/maximal assistance  

E. Chair/bed-to-chair transfer  02-Substantial/maximal assistance  

F. Toilet transfer  02-Substantial/maximal assistance  

G. Car transfer  88-Not attempted due to medical condition or safety concerns  

I. Walk 10 feet  88-Not attempted due to medical condition or safety concerns  

J. Walk 50 feet with two turns  88-Not attempted due to medical condition or safety concerns  

K. Walk 150 feet  88-Not attempted due to medical condition or safety concerns  

L. Walking 10 feet on uneven surfaces  88-Not attempted due to medical condition or safety concerns  


M. 1 step (curb)  88-Not attempted due to medical condition or safety concerns  

N. 4 steps  88-Not attempted due to medical condition or safety concerns  

O. 12 steps  88-Not attempted due to medical condition or safety concerns  

P. Picking up object  88-Not attempted due to medical condition or safety concerns  

R. Wheel 50 feet with two turns  09-Not applicable  

S. Wheel 150 feet  09-Not applicable  

- Bladder and Bowel

Bladder continence  0-Always continent  

Bowel continence  0-Always continent  

- Endurance

Poor

- Balance

Poor

- Safety Awareness

Fair



[facility]IC[facility]:



Self-Care, Mobility, Endurance, Balance, and Safety Awareness



SIGNATURE PANEL:



Electronically signed by Dr. Dev Feliciano M.D. on 09/06/2022 at 18:36 (CDT)

## 2022-09-07 RX ADMIN — FLUOCINONIDE PRN APPL: 0.5 CREAM TOPICAL at 15:19

## 2022-09-07 RX ADMIN — VALSARTAN SCH MG: 80 TABLET ORAL at 08:06

## 2022-09-07 RX ADMIN — FAMOTIDINE SCH MG: 20 TABLET, FILM COATED ORAL at 08:06

## 2022-09-07 RX ADMIN — FLUOCINONIDE PRN APPL: 0.5 CREAM TOPICAL at 20:02

## 2022-09-07 RX ADMIN — Medication SCH: at 12:00

## 2022-09-07 RX ADMIN — Medication SCH MG: at 08:07

## 2022-09-07 RX ADMIN — LACTOBACILLUS TAB SCH TAB: TAB at 08:07

## 2022-09-07 RX ADMIN — ACETAMINOPHEN PRN MG: 500 TABLET, FILM COATED ORAL at 20:01

## 2022-09-07 RX ADMIN — MELATONIN PRN MG: 3 TAB ORAL at 20:01

## 2022-09-07 RX ADMIN — MONTELUKAST SODIUM SCH MG: 10 TABLET, FILM COATED ORAL at 20:02

## 2022-09-07 RX ADMIN — RIVAROXABAN SCH MG: 10 TABLET, FILM COATED ORAL at 17:14

## 2022-09-07 RX ADMIN — AMLODIPINE BESYLATE SCH MG: 5 TABLET ORAL at 20:02

## 2022-09-07 RX ADMIN — FAMOTIDINE SCH MG: 20 TABLET, FILM COATED ORAL at 20:01

## 2022-09-07 RX ADMIN — VALSARTAN SCH MG: 80 TABLET ORAL at 20:01

## 2022-09-07 RX ADMIN — AMLODIPINE BESYLATE SCH MG: 5 TABLET ORAL at 08:07

## 2022-09-07 RX ADMIN — MAGNESIUM OXIDE TAB 400 MG (241.3 MG ELEMENTAL MG) SCH MG: 400 (241.3 MG) TAB at 08:07

## 2022-09-07 RX ADMIN — ASPIRIN SCH MG: 81 TABLET, COATED ORAL at 08:07

## 2022-09-07 RX ADMIN — Medication SCH MG: at 20:01

## 2022-09-07 NOTE — R.PN
PROGRESS NOTES



ENCOUNTER DATE AND TIME:



09/07/2022 19:01 (CDT)



NAME



NICOLE BUNN



YOB: 1965



DATE OF ADMISSION:



08/29/2022 12:59 (CDT)



DeconditioningCHIEF COMPLAINT:



Sepsis, debility



SUBJECTIVE:



Pt denied any Shortness of Breath.

Pt denied any depression.

CBC with diff is essentially normal. Potassium is normal at 4.6, glucose 170.

Self-propelled wheelchair 500'  with independence. Ambulated 8' x 4 in the parallel bars. Ambulated 5
' with rolling walker and minimum assistance.



VITAL SIGNS



Temperature: 98.0 F

SBP/DBP: 139/59

Pulse: 55

Resp: 16



MEDICATION ALLERGIES:



No Known Drug Allergies (NKDA)



ENVIRONMENTAL ALLERGIES:



- Substance Allergies

None Known

- Other Allergies

None Known



NURSING:



- Shower

allowing shower



ACTIVITIES



OOB only with supervision



THERAPIES:



- Dietary and Nutrition

Adequate Nutrition. Nutritional Education. Nutritional Supplements. 



- Occupational Therapy

Cognitive Retraining. Patient needs Occupational Therapy for a daily minimum of 1.5 hours at least 5 
out of 7 days, to improve Activities of Daily Living, including: Eating, Grooming, Bathing, Dressing,
 Toileting, Toilet Transfers, Community Reintegration, Higher functional activities, Adaptive Equipme
nt, Splinting, Household Tasks, and Other activities as determined. Visual Perceptual Training. 



- Physical Therapy

Patient needs Physical Therapy for a daily minimum of 1.5 hours at least 5 out of 7 days, to improve:
 Mobility, Strengthening, Transfers, Stretching, ROM, Endurance, Ability to manage stairs, Gait, and 
Balance. 



PHYSICAL EXAM



- Gen

Alert and awake

Lying in bed

No apparent distress

Oriented to: person, time, and place

- Skin

No skin breakdown.



No abnormalities

- Eyes

No abnormalities

- ENMT

No abnormalities

- Neck

No abnormalities

- CVS

RRR

- Chest

No abnormalities

- Abd

Obese, soft, nontender

- GI

Non distended



Deferred

- 

No abnormalities

- Ext

Mild bilateral lower extremity edema.

- MSK

4+/5 weakness in both lower extremities.

- Neuro

No focal deficits



ASSESSMENT:



Pt. is a 56 yo female of unknown race.On 08/13/2022 she was admitted to [facility] with di
agnosis Deconditioning.Her impairment category is Debility 16 -  Debility (16).Pre-morbidly, Pt. was 
independent/mod-I in Locomotion and Self-Care; and she had good Safety Awareness, Balance, Transfers 
Control, and Self-Care.Currently, she has deficits of Locomotion, Safety Awareness, Balance, Transfer
s Control, Self-Care, and Endurance.Pt. is now referred to [facility] for acut
e in-patient rehabilitation in order to maximize patient's functional independence in activities of d
aily living, strength, ROM, and mobility.- Rehab Goal

Patient has realistic goal of being discharged at assistance level 6-Nara to reside at Home with  Fam
michell/Relatives.

MDM/PLAN:



- Physical Therapy

 Gait dysfunction - to improve, our physical therapists will perform initial evaluation of pt's statu
s upon admission and devise an individualized program for Gait Training, and Wheel Chair mobility

 Inability to transfer - to improve, our physical therapists will perform initial evaluation of pt's 
status upon admission and devise an individualized program for Bed mobility

 Need for home safety evaluation - to improve, our physical therapists will perform initial evaluatio
n of pt's status upon admission and devise an individualized program for Home Evaluation

 Need in caregiver upon discharge - to improve, our physical therapists will perform initial evaluati
on of pt's status upon admission and devise an individualized program for Caregiver Training

 New precaution - to improve, our physical therapists will perform initial evaluation of pt's status 
upon admission and devise an individualized program for Patient precaution education

 Edema - to improve, our physical therapists will perform initial evaluation of pt's status upon admi
ssion and devise an individualized program for Elevation Training, and Lymphedema Therapy

 Poor balance - to improve, our physical therapists will perform initial evaluation of pt's status up
on admission and devise an individualized program for Balance Training

 Poor endurance - to improve, our physical therapists will perform initial evaluation of pt's status 
upon admission and devise an individualized program for Endurance Training

 Weakness - to improve, our physical therapists will perform initial evaluation of pt's status upon a
dmission and devise an individualized program for Aquatic Therapy, Neuromuscular Reeducation, and Str
engthening

 Achieving independence - to improve, our physical therapists will perform initial evaluation of pt's
 status upon admission and devise an individualized program for Community Reintegration Activities

- Occupational Therapy

 ADL deficits - to improve, our occupation therapists will perform initial evaluation of pt's status 
upon admission and devise an individualized program for Bathing, Bed mobility, Community Reintegratio
n, Cooking, Dressing, Eating, Fine Motor Skills, Grooming, Homemaking, Kitchen Mobility, Laundry, Pat
ient Education, Safety Awareness, Splinting - Positioning, Transfers(Toilet, Tub, Shower), and Wheel 
Chair Management

 Need for care giver - to improve, our occupation therapists will perform initial evaluation of pt's 
status upon admission and devise an individualized program for Caregiver Training

 Weakness - to improve, our occupation therapists will perform initial evaluation of pt's status upon
 admission and devise an individualized program for Aquatic Therapy, Balance, Endurance, UE ROM, and 
UE strengthening

- Other

 See attached MAR (Medication Administration Record)

- Diet Type

Continue Regular

- Diet - Liquid Texture

Continue Regular

- Tube Feed

Continue N/A

- Diet - Solid Texture

Continue Regular

- Shower

 allowing shower



FUNCTIONAL STATUS: UPDATED AT WEEKLY TEAM CONFERENCE



- Bladder

Same accident frequency: 7-Ind - No accidents in the past 7 days

- Bowel

Same accident frequency: 7-Ind - No accidents in the past 7 days

- Walking

Same score based on distance walked: 0(N/A)

- Wheelchair

Same score based on distance traveled: 0(N/A)



FUNCTIONAL STATUS:



- Self-Care

A. Eating    Ind   

B. Grooming    Nara   

C. Bathing    modA   

D. Dressing - Upper     Maureen   

E. Dressing - Lower     modA   

F. Toileting    sup   

- Sphincter Control

G. Bladder control     Nara   

H. Bowel control     Nara   

- Transfers Control

I. Bed/Chair/Wheelchair     modA   

J. Toilet    modA   

K. Tub/Shower    modA   

- Locomotion

L. Walk/Wheelchair (B)     modA   

M. Stairs    Maureen   

- Communication

N. Comprehension (B)     Nara   

O. Expression (B)     Nara   

- Social Cognition

P. Social Interaction    Nara   

Q. Problem Solving    Nara   

R. Memory    Nara   

- Endurance

Fair

- Balance

Poor

- Safety Awareness

Good



QI SCORES:



- Self-Care

A. Eating  04-Supervision or touching assistance  

B. Oral hygiene  04-Supervision or touching assistance  

C. Toileting hygiene  02-Substantial/maximal assistance  

E. Shower/bathe self  02-Substantial/maximal assistance  

F. Upper body dressing  03-Partial/moderate assistance  

G. Lower body dressing  02-Substantial/maximal assistance  

H. Putting on/taking off footwear  02-Substantial/maximal assistance  

- Mobility

A. Roll left and right  04-Supervision or touching assistance  

B. Sit to lying  04-Supervision or touching assistance  

C. Lying to sitting on side of bed  04-Supervision or touching assistance  

D. Sit to stand  02-Substantial/maximal assistance  

E. Chair/bed-to-chair transfer  02-Substantial/maximal assistance  

F. Toilet transfer  02-Substantial/maximal assistance  

G. Car transfer  88-Not attempted due to medical condition or safety concerns  

I. Walk 10 feet  88-Not attempted due to medical condition or safety concerns  

J. Walk 50 feet with two turns  88-Not attempted due to medical condition or safety concerns  

K. Walk 150 feet  88-Not attempted due to medical condition or safety concerns  

L. Walking 10 feet on uneven surfaces  88-Not attempted due to medical condition or safety concerns  


M. 1 step (curb)  88-Not attempted due to medical condition or safety concerns  

N. 4 steps  88-Not attempted due to medical condition or safety concerns  

O. 12 steps  88-Not attempted due to medical condition or safety concerns  

P. Picking up object  88-Not attempted due to medical condition or safety concerns  

R. Wheel 50 feet with two turns  09-Not applicable  

S. Wheel 150 feet  09-Not applicable  

- Bladder and Bowel

Bladder continence  0-Always continent  

Bowel continence  0-Always continent  

- Endurance

Poor

- Balance

Poor

- Safety Awareness

Fair



CURRENT [facility]. DEFICITS:



Self-Care, Mobility, Endurance, Balance, and Safety Awareness



SIGNATURE PANEL:



Electronically signed by Dr. Dev Feliciano M.D. on 09/07/2022 at 19:03 (CDT)

## 2022-09-08 LAB
ALBUMIN SERPL BCP-MCNC: 2.7 G/DL (ref 3.4–5)
BUN BLD-MCNC: 35 MG/DL (ref 7–18)
GLUCOSE SERPLBLD-MCNC: 95 MG/DL (ref 74–106)
HCT VFR BLD CALC: 33.7 % (ref 36–45)
LYMPHOCYTES # SPEC AUTO: 2.7 K/UL (ref 0.7–4.9)
MAGNESIUM SERPL-MCNC: 2.2 MG/DL (ref 1.8–2.4)
MCV RBC: 89.9 FL (ref 80–100)
MORPHOLOGY BLD-IMP: (no result)
PMV BLD: 7.4 FL (ref 7.6–11.3)
POTASSIUM SERPL-SCNC: 4.3 MMOL/L (ref 3.5–5.1)
PREALB SERPL-MCNC: 35.7 MG/DL (ref 20–40)
RBC # BLD: 3.75 M/UL (ref 3.86–4.86)

## 2022-09-08 RX ADMIN — Medication SCH: at 12:00

## 2022-09-08 RX ADMIN — MELATONIN PRN MG: 3 TAB ORAL at 20:13

## 2022-09-08 RX ADMIN — ACETAMINOPHEN PRN MG: 500 TABLET, FILM COATED ORAL at 20:13

## 2022-09-08 RX ADMIN — Medication SCH MG: at 20:13

## 2022-09-08 RX ADMIN — FAMOTIDINE SCH MG: 20 TABLET, FILM COATED ORAL at 20:13

## 2022-09-08 RX ADMIN — Medication SCH MG: at 07:22

## 2022-09-08 RX ADMIN — ASPIRIN SCH MG: 81 TABLET, COATED ORAL at 07:19

## 2022-09-08 RX ADMIN — MAGNESIUM OXIDE TAB 400 MG (241.3 MG ELEMENTAL MG) SCH MG: 400 (241.3 MG) TAB at 07:22

## 2022-09-08 RX ADMIN — AMLODIPINE BESYLATE SCH MG: 5 TABLET ORAL at 08:00

## 2022-09-08 RX ADMIN — DIPHENHYDRAMINE HYDROCHLORIDE PRN MG: 25 CAPSULE ORAL at 20:13

## 2022-09-08 RX ADMIN — MONTELUKAST SODIUM SCH MG: 10 TABLET, FILM COATED ORAL at 20:13

## 2022-09-08 RX ADMIN — LACTOBACILLUS TAB SCH TAB: TAB at 07:22

## 2022-09-08 RX ADMIN — VALSARTAN SCH MG: 80 TABLET ORAL at 09:55

## 2022-09-08 RX ADMIN — AMLODIPINE BESYLATE SCH MG: 5 TABLET ORAL at 20:14

## 2022-09-08 RX ADMIN — RIVAROXABAN SCH MG: 10 TABLET, FILM COATED ORAL at 17:14

## 2022-09-08 RX ADMIN — ACETAMINOPHEN PRN MG: 500 TABLET, FILM COATED ORAL at 07:18

## 2022-09-08 RX ADMIN — FAMOTIDINE SCH MG: 20 TABLET, FILM COATED ORAL at 07:19

## 2022-09-08 RX ADMIN — VALSARTAN SCH MG: 80 TABLET ORAL at 20:14

## 2022-09-08 NOTE — R.PN
PROGRESS NOTES



ENCOUNTER DATE AND TIME:



09/08/2022 18:30 (CDT)



NAME



NICOLE BUNN



YOB: 1965



DATE OF ADMISSION:



08/29/2022 12:59 (CDT)



DeconditioningCHIEF COMPLAINT:



Sepsis, debility



SUBJECTIVE:



Pt denied any Shortness of Breath.

Pt denied any depression.

CBC with diff is unremarkable. Potassium is normal at 4.6, glucose 95 to 170.

Self-propelled wheelchair 500'  with independence.



VITAL SIGNS



Temperature: 97.2 F

SBP/DBP: 151/65

Pulse: 57

Resp: 16



MEDICATION ALLERGIES:



No Known Drug Allergies (NKDA)



ENVIRONMENTAL ALLERGIES:



- Substance Allergies

None Known

- Other Allergies

None Known



NURSING:



- Shower

allowing shower



ACTIVITIES



OOB only with supervision



THERAPIES:



- Dietary and Nutrition

Adequate Nutrition. Nutritional Education. Nutritional Supplements. 



- Occupational Therapy

Cognitive Retraining. Patient needs Occupational Therapy for a daily minimum of 1.5 hours at least 5 
out of 7 days, to improve Activities of Daily Living, including: Eating, Grooming, Bathing, Dressing,
 Toileting, Toilet Transfers, Community Reintegration, Higher functional activities, Adaptive Equipme
nt, Splinting, Household Tasks, and Other activities as determined. Visual Perceptual Training. 



- Physical Therapy

Patient needs Physical Therapy for a daily minimum of 1.5 hours at least 5 out of 7 days, to improve:
 Mobility, Strengthening, Transfers, Stretching, ROM, Endurance, Ability to manage stairs, Gait, and 
Balance. 



PHYSICAL EXAM



- Gen

Alert and awake

Lying in bed

No apparent distress

Oriented to: person, time, and place

- Skin

No skin breakdown.



No abnormalities

- Eyes

No abnormalities

- ENMT

No abnormalities

- Neck

No abnormalities

- CVS

RRR

- Chest

No abnormalities

- Abd

Obese, soft, nontender

- GI

Non distended



Deferred

- 

No abnormalities

- Ext

Mild bilateral lower extremity edema.

- MSK

4+/5 weakness in both lower extremities.

- Neuro

No focal deficits



ASSESSMENT:



Pt. is a 56 yo female of unknown race.[facility].Her impairment category is Debility 16 -  Debility (16).Pre-morbidly, Pt. was 
independent/mod-I in Locomotion and Self-Care; and she had good Safety Awareness, Balance, Transfers 
Control, and Self-Care.Currently, she has deficits of Locomotion, Safety Awareness, Balance, Transfer
s Control, Self-Care, and Endurance.Pt. is now referred to [facility] for acut
e in-patient rehabilitation in order to maximize patient's functional independence in activities of d
aily living, strength, ROM, and mobility.- Rehab Goal

Patient has realistic goal of being discharged at assistance level 6-Nara to reside at Home with  Fam
michell/Relatives.

MDM/PLAN:



- Physical Therapy

 Gait dysfunction - to improve, our physical therapists will perform initial evaluation of pt's statu
s upon admission and devise an individualized program for Gait Training, and Wheel Chair mobility

 Inability to transfer - to improve, our physical therapists will perform initial evaluation of pt's 
status upon admission and devise an individualized program for Bed mobility

 Need for home safety evaluation - to improve, our physical therapists will perform initial evaluatio
n of pt's status upon admission and devise an individualized program for Home Evaluation

 Need in caregiver upon discharge - to improve, our physical therapists will perform initial evaluati
on of pt's status upon admission and devise an individualized program for Caregiver Training

 New precaution - to improve, our physical therapists will perform initial evaluation of pt's status 
upon admission and devise an individualized program for Patient precaution education

 Edema - to improve, our physical therapists will perform initial evaluation of pt's status upon admi
ssion and devise an individualized program for Elevation Training, and Lymphedema Therapy

 Poor balance - to improve, our physical therapists will perform initial evaluation of pt's status up
on admission and devise an individualized program for Balance Training

 Poor endurance - to improve, our physical therapists will perform initial evaluation of pt's status 
upon admission and devise an individualized program for Endurance Training

 Weakness - to improve, our physical therapists will perform initial evaluation of pt's status upon a
dmission and devise an individualized program for Aquatic Therapy, Neuromuscular Reeducation, and Str
engthening

 Achieving independence - to improve, our physical therapists will perform initial evaluation of pt's
 status upon admission and devise an individualized program for Community Reintegration Activities

- Occupational Therapy

 ADL deficits - to improve, our occupation therapists will perform initial evaluation of pt's status 
upon admission and devise an individualized program for Bathing, Bed mobility, Community Reintegratio
n, Cooking, Dressing, Eating, Fine Motor Skills, Grooming, Homemaking, Kitchen Mobility, Laundry, Pat
ient Education, Safety Awareness, Splinting - Positioning, Transfers(Toilet, Tub, Shower), and Wheel 
Chair Management

 Need for care giver - to improve, our occupation therapists will perform initial evaluation of pt's 
status upon admission and devise an individualized program for Caregiver Training

 Weakness - to improve, our occupation therapists will perform initial evaluation of pt's status upon
 admission and devise an individualized program for Aquatic Therapy, Balance, Endurance, UE ROM, and 
UE strengthening

- Other

 See attached MAR (Medication Administration Record)

- Diet Type

Continue Regular

- Diet - Liquid Texture

Continue Regular

- Tube Feed

Continue N/A

- Diet - Solid Texture

Continue Regular

- Shower

 allowing shower



FUNCTIONAL STATUS: UPDATED AT WEEKLY TEAM CONFERENCE



- Bladder

Same accident frequency: 7-Ind - No accidents in the past 7 days

- Bowel

Same accident frequency: 7-Ind - No accidents in the past 7 days

- Walking

Same score based on distance walked: 0(N/A)

- Wheelchair

Same score based on distance traveled: 0(N/A)



FUNCTIONAL STATUS:



- Self-Care

A. Eating    Ind   

B. Grooming    Nara   

C. Bathing    modA   

D. Dressing - Upper     Maureen   

E. Dressing - Lower     modA   

F. Toileting    sup   

- Sphincter Control

G. Bladder control     Nara   

H. Bowel control     Nara   

- Transfers Control

I. Bed/Chair/Wheelchair     modA   

J. Toilet    modA   

K. Tub/Shower    modA   

- Locomotion

L. Walk/Wheelchair (B)     modA   

M. Stairs    Maureen   

- Communication

N. Comprehension (B)     Nara   

O. Expression (B)     Nara   

- Social Cognition

P. Social Interaction    Nara   

Q. Problem Solving    Nara   

R. Memory    Nara   

- Endurance

Fair

- Balance

Poor

- Safety Awareness

Good



QI SCORES:



- Self-Care

A. Eating  04-Supervision or touching assistance  

B. Oral hygiene  04-Supervision or touching assistance  

C. Toileting hygiene  02-Substantial/maximal assistance  

E. Shower/bathe self  02-Substantial/maximal assistance  

F. Upper body dressing  03-Partial/moderate assistance  

G. Lower body dressing  02-Substantial/maximal assistance  

H. Putting on/taking off footwear  02-Substantial/maximal assistance  

- Mobility

A. Roll left and right  04-Supervision or touching assistance  

B. Sit to lying  04-Supervision or touching assistance  

C. Lying to sitting on side of bed  04-Supervision or touching assistance  

D. Sit to stand  02-Substantial/maximal assistance  

E. Chair/bed-to-chair transfer  02-Substantial/maximal assistance  

F. Toilet transfer  02-Substantial/maximal assistance  

G. Car transfer  88-Not attempted due to medical condition or safety concerns  

I. Walk 10 feet  88-Not attempted due to medical condition or safety concerns  

J. Walk 50 feet with two turns  88-Not attempted due to medical condition or safety concerns  

K. Walk 150 feet  88-Not attempted due to medical condition or safety concerns  

L. Walking 10 feet on uneven surfaces  88-Not attempted due to medical condition or safety concerns  


M. 1 step (curb)  88-Not attempted due to medical condition or safety concerns  

N. 4 steps  88-Not attempted due to medical condition or safety concerns  

O. 12 steps  88-Not attempted due to medical condition or safety concerns  

P. Picking up object  88-Not attempted due to medical condition or safety concerns  

R. Wheel 50 feet with two turns  09-Not applicable  

S. Wheel 150 feet  09-Not applicable  

- Bladder and Bowel

Bladder continence  0-Always continent  

Bowel continence  0-Always continent  

- Endurance

Poor

- Balance

Poor

- Safety Awareness

Fair



[facility]:



Self-Care, Mobility, Endurance, Balance, and Safety Awareness



SIGNATURE PANEL:



Electronically signed by Dr. Dev Feliciano M.D. on 09/08/2022 at 18:33 (CDT)

## 2022-09-09 RX ADMIN — MAGNESIUM OXIDE TAB 400 MG (241.3 MG ELEMENTAL MG) SCH MG: 400 (241.3 MG) TAB at 08:13

## 2022-09-09 RX ADMIN — VALSARTAN SCH MG: 80 TABLET ORAL at 12:10

## 2022-09-09 RX ADMIN — Medication SCH MG: at 20:05

## 2022-09-09 RX ADMIN — LACTOBACILLUS TAB SCH TAB: TAB at 08:04

## 2022-09-09 RX ADMIN — MONTELUKAST SODIUM SCH MG: 10 TABLET, FILM COATED ORAL at 20:06

## 2022-09-09 RX ADMIN — Medication SCH MG: at 08:03

## 2022-09-09 RX ADMIN — AMLODIPINE BESYLATE SCH MG: 5 TABLET ORAL at 08:03

## 2022-09-09 RX ADMIN — VALSARTAN SCH MG: 80 TABLET ORAL at 20:05

## 2022-09-09 RX ADMIN — ACETAMINOPHEN PRN MG: 500 TABLET, FILM COATED ORAL at 07:06

## 2022-09-09 RX ADMIN — Medication SCH: at 12:00

## 2022-09-09 RX ADMIN — ASPIRIN SCH MG: 81 TABLET, COATED ORAL at 07:06

## 2022-09-09 RX ADMIN — MELATONIN PRN MG: 3 TAB ORAL at 20:07

## 2022-09-09 RX ADMIN — AMLODIPINE BESYLATE SCH MG: 5 TABLET ORAL at 20:06

## 2022-09-09 RX ADMIN — FAMOTIDINE SCH MG: 20 TABLET, FILM COATED ORAL at 20:06

## 2022-09-09 RX ADMIN — ACETAMINOPHEN PRN MG: 500 TABLET, FILM COATED ORAL at 21:19

## 2022-09-09 RX ADMIN — SENNOSIDES AND DOCUSATE SODIUM PRN TAB: 8.6; 5 TABLET ORAL at 20:08

## 2022-09-09 RX ADMIN — FAMOTIDINE SCH MG: 20 TABLET, FILM COATED ORAL at 07:06

## 2022-09-09 RX ADMIN — RIVAROXABAN SCH MG: 10 TABLET, FILM COATED ORAL at 17:08

## 2022-09-09 NOTE — P.RH.PN
Estimated Length of Stay: 13


Expected Discharge Date: 09/12/22


Discharge Disposition Plan: Home


Family Support: Yes


Long Term Goal: Mobility, Transfers, Self Care


Vital Signs: 


                                Last Vital Signs











Temp  97.4 F   09/09/22 07:45


 


Pulse  59   09/09/22 08:03


 


Resp  18   09/09/22 07:45


 


BP  128/57 L  09/09/22 08:03


 


Pulse Ox  99   09/09/22 07:45











Laboratory: 


                             Laboratory Last Values











WBC  10.20 K/uL (4.3-10.9)  D 09/08/22  04:16    


 


RBC  3.75 M/uL (3.86-4.86)  L  09/08/22  04:16    


 


Hgb  11.8 g/dL (12.0-15.0)  L  09/08/22  04:16    


 


Hct  33.7 % (36.0-45.0)  L  09/08/22  04:16    


 


MCV  89.9 fL ()   09/08/22  04:16    


 


MCH  31.4 pg (27.0-35.0)   09/08/22  04:16    


 


MCHC  34.9 g/dL (32.0-36.0)   09/08/22  04:16    


 


RDW  15.5 % (12.1-15.2)  H  09/08/22  04:16    


 


Plt Count  217 K/uL (152-406)   09/08/22  04:16    


 


MPV  7.4 fL (7.6-11.3)  L D 09/08/22  04:16    


 


Neutrophils %  39.9 % (41.7-73.7)  L  09/08/22  04:16    


 


Lymphocytes %  26.1 % (15.3-44.8)   09/08/22  04:16    


 


Monocytes %  5.4 % (3.3-12.3)   09/08/22  04:16    


 


Eosinophils %  27.3 % (0-4.4)  H  09/08/22  04:16    


 


Basophils %  1.3 % (0-1.3)   09/08/22  04:16    


 


Absolute Neutrophils  4.1 K/uL (1.8-8.0)   09/08/22  04:16    


 


Segmented Neutrophils  44 % (40-80)   09/08/22  04:16    


 


Band Neutrophils  1 % (0-1)   09/08/22  04:16    


 


Absolute Lymphocytes  2.7 K/uL (0.7-4.9)   09/08/22  04:16    


 


Lymphocytes  27 % (15-42)   09/08/22  04:16    


 


Monocytes  5 % (0-10)   09/08/22  04:16    


 


Absolute Monocytes  0.6 K/uL (0.1-1.3)   09/08/22  04:16    


 


Eosinophils  23 % (0-3)  H  09/08/22  04:16    


 


Absolute Eosinophils  2.8 K/uL (0-0.5)  H  09/08/22  04:16    


 


Absolute Basophils  0.1 K/uL (0-0.5)   09/08/22  04:16    


 


Platelet Estimate  Adeq   09/08/22  04:16    


 


Morphology Comment  Not seen  (NOT SEEN)   09/08/22  04:16    


 


Sodium  139 mmol/L (136-145)   09/08/22  04:16    


 


Potassium  4.3 mmol/L (3.5-5.1)   09/08/22  04:16    


 


Chloride  108 mmol/L ()  H  09/08/22  04:16    


 


Carbon Dioxide  27 mmol/L (21-32)   09/08/22  04:16    


 


Anion Gap  8.3 mEq/L (5.0-15.0)   09/08/22  04:16    


 


BUN  35 mg/dL (7-18)  H  09/08/22  04:16    


 


Creatinine  0.98 mg/dL (0.55-1.3)   09/08/22  04:16    


 


Est GFR (CKD-EPI)  67 ml/min (=/>90)  L  09/08/22  04:16    


 


Glucose  95 mg/dL ()   09/08/22  04:16    


 


Calcium  8.6 mg/dL (8.5-10.1)   09/08/22  04:16    


 


Magnesium  2.2 mg/dL (1.8-2.4)   09/08/22  04:16    


 


Albumin  2.7 g/dL (3.4-5.0)  L  09/08/22  04:16    


 


Prealbumin  35.7 mg/dL (20-40)   09/08/22  04:16    


 


Urine Color  Yellow  (Yellow)   08/29/22  21:01    


 


Urine Clarity  Cloudy  (Clear)   08/29/22  21:01    


 


Urine pH  5.5  (5.0-7.0)   08/29/22  21:01    


 


Ur Specific Gravity  1.020  (1.005-1.030)   08/29/22  21:01    


 


Glucose (UA)(Auto)  Negative  (Negative)   08/29/22  21:01    


 


Urine Ketones  Negative  (Negative)   08/29/22  21:01    


 


Urine Blood  3+  (Negative)  H  08/29/22  21:01    


 


Urine Nitrite  Negative  (Negative)   08/29/22  21:01    


 


Urine Bilirubin  Negative  (Negative)   08/29/22  21:01    


 


Urine Urobilinogen  0.2 mg/dL (0.2-1.0)   08/29/22  21:01    


 


Ur Leukocyte Esterase  Negative  (Negative)   08/29/22  21:01    


 


Urine RBC  11-20 /HPF (None Seen)  H  08/29/22  21:01    


 


Urine Red Cell Clumps  Cancelled   08/29/22  20:40    


 


Urine WBC  <5 /HPF (<5)   08/29/22  21:01    


 


Urine WBC Clumps  Cancelled   08/29/22  20:40    


 


Ur Squamous Epith Cells  <5 /HPF (None Seen)   08/29/22  21:01    


 


U Non-Squamous Epi Cells  Cancelled   08/29/22  20:40    


 


Ur Transition Epith Cell  Cancelled   08/29/22  20:40    


 


Ur Renal Epithelial Cell  Cancelled   08/29/22  20:40    


 


Calcium Carbonate Cryst  Cancelled   08/29/22  20:40    


 


Calcium Oxalate Crystal  Cancelled   08/29/22  20:40    


 


Leucine Crystals  Cancelled   08/29/22  20:40    


 


Cystine Crystals  Cancelled   08/29/22  20:40    


 


Uric Acid Crystals  Cancelled   08/29/22  20:40    


 


Triple Phos Crystals  Cancelled   08/29/22  20:40    


 


Tyrosine Crystals  Cancelled   08/29/22  20:40    


 


Unidentified Crystals  Cancelled   08/29/22  20:40    


 


Amorphous Crystals  Cancelled   08/29/22  20:40    


 


Urine Bacteria  >50 /HPF (<20)  H  08/29/22  21:01    


 


Hyaline Casts  Cancelled   08/29/22  20:40    


 


Granular Casts  Cancelled   08/29/22  20:40    


 


Waxy Casts  Cancelled   08/29/22  20:40    


 


RBC Casts  Cancelled   08/29/22  20:40    


 


WBC Casts  Cancelled   08/29/22  20:40    


 


Urine Mucus  Cancelled   08/29/22  20:40    


 


Urine Trichomonas  Cancelled   08/29/22  20:40    


 


Ur Yeast w Hyphae  Cancelled   08/29/22  20:40    


 


Urine Yeast (Budding)  Cancelled   08/29/22  20:40    


 


Urine Sperm  Cancelled   08/29/22  20:40    


 


Ur Oval Fat Bodies  Cancelled   08/29/22  20:40    


 


Urine Total Protein  1+  (Negative)  H  08/29/22  21:01    


 


Urine Ascorbic Acid  Cancelled   08/29/22  20:40    


 


Urine Fat  Cancelled   08/29/22  20:40    


 


SARS-CoV-2 Rap RNA(RT-PCR)  Negative  (NEGATIVE)   09/05/22  05:37    











Weight: 291 lb


Within Defined Parameters: Yes


Wound Present: Yes


Closed Surgical Incision Present: No


Negative Pressure Wound Therapy Present: No


Physician Update: Labs were reviewed and are stable. Ambulating 8' x 4 in 

parallel bars. Mod I with bed mobility. Sit to stand and transfers with SBA. WC 

250' with independence.


Comment: noted jahaira lower ext discoloration and scaly skin


Functional Improvement: Patient was fairly newly admitted, however demonstrates 

good work ethic, and desire to improve.  Patient continues to be fearful of 

"falling".


Summary: Patient's care plan and long term goals have been reviewed and revised 

as necessary. Please see the Rehabilitation Signature page for all necessary 

signatures.

## 2022-09-10 VITALS — DIASTOLIC BLOOD PRESSURE: 56 MMHG | SYSTOLIC BLOOD PRESSURE: 120 MMHG | TEMPERATURE: 98.2 F

## 2022-09-10 RX ADMIN — MAGNESIUM OXIDE TAB 400 MG (241.3 MG ELEMENTAL MG) SCH MG: 400 (241.3 MG) TAB at 08:31

## 2022-09-10 RX ADMIN — FLUOCINONIDE PRN APPL: 0.5 CREAM TOPICAL at 08:29

## 2022-09-10 RX ADMIN — AMLODIPINE BESYLATE SCH MG: 5 TABLET ORAL at 08:30

## 2022-09-10 RX ADMIN — VALSARTAN SCH MG: 80 TABLET ORAL at 08:30

## 2022-09-10 RX ADMIN — Medication SCH MG: at 08:29

## 2022-09-10 RX ADMIN — ASPIRIN SCH MG: 81 TABLET, COATED ORAL at 08:30

## 2022-09-10 RX ADMIN — LACTOBACILLUS TAB SCH TAB: TAB at 08:29

## 2022-09-10 RX ADMIN — FAMOTIDINE SCH MG: 20 TABLET, FILM COATED ORAL at 08:29
